# Patient Record
Sex: FEMALE | Race: WHITE | Employment: FULL TIME | URBAN - METROPOLITAN AREA
[De-identification: names, ages, dates, MRNs, and addresses within clinical notes are randomized per-mention and may not be internally consistent; named-entity substitution may affect disease eponyms.]

---

## 2017-12-08 PROBLEM — M25.361 RIGHT KNEE BUCKLING: Chronic | Status: ACTIVE | Noted: 2017-12-08

## 2017-12-12 PROBLEM — S83.511A RIGHT ACL TEAR: Status: ACTIVE | Noted: 2017-12-12

## 2018-03-13 ENCOUNTER — APPOINTMENT (OUTPATIENT)
Dept: PHYSICAL THERAPY | Age: 28
End: 2018-03-13
Payer: COMMERCIAL

## 2018-03-15 ENCOUNTER — HOSPITAL ENCOUNTER (OUTPATIENT)
Dept: PHYSICAL THERAPY | Age: 28
Setting detail: THERAPIES SERIES
Discharge: HOME | End: 2018-03-15
Payer: COMMERCIAL

## 2018-03-15 PROCEDURE — G8979 MOBILITY GOAL STATUS: HCPCS | Performed by: PHYSICAL THERAPIST

## 2018-03-15 PROCEDURE — G8978 MOBILITY CURRENT STATUS: HCPCS | Performed by: PHYSICAL THERAPIST

## 2018-03-15 PROCEDURE — 97530 THERAPEUTIC ACTIVITIES: CPT | Performed by: PHYSICAL THERAPIST

## 2018-03-15 PROCEDURE — 97110 THERAPEUTIC EXERCISES: CPT | Performed by: PHYSICAL THERAPIST

## 2018-03-15 NOTE — PROGRESS NOTES
[address]      Phone: [phone]  Fax: [phone]    Physical Therapy Daily Treatment Note  Date:  3/15/2018    Patient Name:  Naun Hall    :  1990  MRN: [id number]    Restrictions/Precautions:  Meniscus repair and ACL reconstruction protocols, 3/2/18 : s/p Doctor visit : cleared to discontinue brace and crutches , FWB R LE .   per meniscal repair protocol, . Diagnosis:  S/p ACL reconstruction, R meniscal injury  Treatment Diagnosis:    Insurance/Certification information:  [facility]  Referring Physician:  Garrett Choi MD  Plan of care signed (Y/N):    Visit# / total visits:  10/16-  Pain level: 0/10   Time In:  0804  Time Out:  9437    Subjective:.   Nothing to report     Exercises:  Exercise/Equipment Resistance/Repetitions Other comments     Recumbent bike 10 mins       Quad sets                         5 sec 3 x 10 reps      Hamstring sets 5 sec 3 x 10 reps      Side lying hip abd 1.5# 2 x 10 reps      Seated hip flex 1.5# 3 x 10 reps      Prone hamstring curls 1.5# 2 x 10 reps      SLR 1.5# x 10 reps      SAQ 1.5# 2 x 10        Standing hip 3 way B LE 2 x 10 reps each               II bars :    Mini squats < 60                        degrees 2x10              6 \" step ups  Fwd 2x10  Side 2x10             total gym 3 x 10 reps Level 7     Single leg ball toss 2 x 20 reps                           Comments:  N/A    Home Exercise Program:  3/1/18, quad sets, hamstring sets; 3/6/18 - SAQ     Manual Treatments:  N/A    Modalities:  N/A    Timed Code Treatment Minutes:  39    Total Treatment Minutes:  39    Treatment/Activity Tolerance:  [x] Patient tolerated treatment well [] Patient limited by fatigue  [] Patient limited by pain  [] Patient limited by other medical complications  [] Other:     Prognosis: [x] Good [] Fair  [] Poor    Patient Requires Follow-up: [x] Yes  [] No    Plan:   [x] Continue per plan of care [] Alter current plan (see comments)  [] Plan of care initiated [] Hold pending MD visit [] Discharge  Plan for Next Session:        Electronically signed by:  TONY Singh [address]

## 2018-03-20 ENCOUNTER — HOSPITAL ENCOUNTER (OUTPATIENT)
Dept: PHYSICAL THERAPY | Age: 28
Setting detail: THERAPIES SERIES
Discharge: HOME | End: 2018-03-20
Payer: COMMERCIAL

## 2018-03-20 PROCEDURE — 97110 THERAPEUTIC EXERCISES: CPT

## 2018-03-20 NOTE — PROGRESS NOTES
[address]      Phone: [phone]  Fax: [phone]    Physical Therapy Daily Treatment Note  Date:  3/20/2018    Patient Name:  Dana George    :  1990  MRN: [id number]    Restrictions/Precautions:  Meniscus repair and ACL reconstruction protocols, 3/2/18 : s/p Doctor visit : cleared to discontinue brace and crutches , FWB R LE .   per meniscal repair protocol, . Diagnosis:  S/p ACL reconstruction, R meniscal injury  Treatment Diagnosis:    Insurance/Certification information:  [facility]  Referring Physician:  Ramon Scruggs MD  Plan of care signed (Y/N):    Visit# / total visits:  -  Pain level: 0/10   Time In:  806  Time Out:  844    Subjective:.   Nothing to report     Exercises:  Exercise/Equipment Resistance/Repetitions Other comments     Recumbent bike 10 mins       Quad sets                         5 sec 3 x 10 reps      Hamstring sets 5 sec 3 x 10 reps      Side lying hip abd 1.5# 2 x 10 reps      Seated hip flex 1.5# 3 x 10 reps      Prone hamstring curls 1.5# 2 x 10 reps      SLR 1.5# x 10 reps      SAQ 1.5# 2 x 10        Standing hip 3 way B LE 2 x 10 reps each               II bars :    Mini squats < 60                        degrees 2x10              6 \" step ups  Fwd 2x10  Side 2x10             total gym 3 x 10 reps Level 7     Single leg ball toss 2 x 20 reps                           Comments:  N/A    Home Exercise Program:  3/1/18, quad sets, hamstring sets; 3/6/18 - SAQ     Manual Treatments:  N/A    Modalities:  N/A    Timed Code Treatment Minutes:  38    Total Treatment Minutes:  38    Treatment/Activity Tolerance:  [x] Patient tolerated treatment well [] Patient limited by fatigue  [] Patient limited by pain  [] Patient limited by other medical complications  [] Other:     Prognosis: [x] Good [] Fair  [] Poor    Patient Requires Follow-up: [x] Yes  [] No    Plan:   [x] Continue per plan of care [] Alter current

## 2018-03-22 ENCOUNTER — HOSPITAL ENCOUNTER (OUTPATIENT)
Dept: PHYSICAL THERAPY | Age: 28
Setting detail: THERAPIES SERIES
Discharge: HOME | End: 2018-03-22
Payer: COMMERCIAL

## 2018-03-22 PROCEDURE — 97110 THERAPEUTIC EXERCISES: CPT | Performed by: PHYSICAL THERAPIST

## 2018-04-03 ENCOUNTER — HOSPITAL ENCOUNTER (OUTPATIENT)
Dept: PHYSICAL THERAPY | Age: 28
Setting detail: THERAPIES SERIES
Discharge: HOME | End: 2018-04-03
Payer: COMMERCIAL

## 2018-04-03 PROCEDURE — 97110 THERAPEUTIC EXERCISES: CPT | Performed by: PHYSICAL THERAPIST

## 2018-04-03 PROCEDURE — 97530 THERAPEUTIC ACTIVITIES: CPT | Performed by: PHYSICAL THERAPIST

## 2018-04-05 ENCOUNTER — HOSPITAL ENCOUNTER (OUTPATIENT)
Dept: PHYSICAL THERAPY | Age: 28
Setting detail: THERAPIES SERIES
Discharge: HOME | End: 2018-04-05
Payer: COMMERCIAL

## 2018-04-05 PROCEDURE — 97110 THERAPEUTIC EXERCISES: CPT | Performed by: PHYSICAL THERAPIST

## 2018-04-06 ENCOUNTER — OFFICE VISIT (OUTPATIENT)
Dept: ORTHOPEDIC SURGERY | Age: 28
End: 2018-04-06

## 2018-04-06 ENCOUNTER — APPOINTMENT (OUTPATIENT)
Dept: PHYSICAL THERAPY | Age: 28
End: 2018-04-06
Payer: COMMERCIAL

## 2018-04-06 VITALS
WEIGHT: 155 LBS | DIASTOLIC BLOOD PRESSURE: 78 MMHG | BODY MASS INDEX: 29.27 KG/M2 | HEIGHT: 61 IN | SYSTOLIC BLOOD PRESSURE: 115 MMHG | HEART RATE: 67 BPM

## 2018-04-06 DIAGNOSIS — S83.511D RUPTURE OF ANTERIOR CRUCIATE LIGAMENT OF RIGHT KNEE, SUBSEQUENT ENCOUNTER: ICD-10-CM

## 2018-04-06 DIAGNOSIS — S83.8X1D MENISCAL INJURY, RIGHT, SUBSEQUENT ENCOUNTER: ICD-10-CM

## 2018-04-06 DIAGNOSIS — Z98.890 S/P ACL RECONSTRUCTION: Primary | ICD-10-CM

## 2018-04-06 DIAGNOSIS — M25.361 RIGHT KNEE BUCKLING: ICD-10-CM

## 2018-04-06 PROCEDURE — 99024 POSTOP FOLLOW-UP VISIT: CPT | Performed by: ORTHOPAEDIC SURGERY

## 2018-04-12 ENCOUNTER — HOSPITAL ENCOUNTER (OUTPATIENT)
Dept: PHYSICAL THERAPY | Age: 28
Setting detail: THERAPIES SERIES
Discharge: HOME | End: 2018-04-12
Payer: COMMERCIAL

## 2018-04-12 PROCEDURE — 97110 THERAPEUTIC EXERCISES: CPT | Performed by: PHYSICAL THERAPIST

## 2018-06-13 ENCOUNTER — OFFICE VISIT (OUTPATIENT)
Dept: ORTHOPEDIC SURGERY | Age: 28
End: 2018-06-13
Payer: COMMERCIAL

## 2018-06-13 VITALS
BODY MASS INDEX: 28.32 KG/M2 | SYSTOLIC BLOOD PRESSURE: 119 MMHG | HEIGHT: 61 IN | DIASTOLIC BLOOD PRESSURE: 76 MMHG | HEART RATE: 75 BPM | WEIGHT: 150 LBS

## 2018-06-13 DIAGNOSIS — M25.361 RIGHT KNEE BUCKLING: ICD-10-CM

## 2018-06-13 DIAGNOSIS — Z98.890 S/P ACL RECONSTRUCTION: Primary | ICD-10-CM

## 2018-06-13 DIAGNOSIS — S83.511D RUPTURE OF ANTERIOR CRUCIATE LIGAMENT OF RIGHT KNEE, SUBSEQUENT ENCOUNTER: ICD-10-CM

## 2018-06-13 DIAGNOSIS — S83.8X1D MENISCAL INJURY, RIGHT, SUBSEQUENT ENCOUNTER: ICD-10-CM

## 2018-06-13 PROCEDURE — 99213 OFFICE O/P EST LOW 20 MIN: CPT | Performed by: ORTHOPAEDIC SURGERY

## 2019-04-08 ENCOUNTER — APPOINTMENT (OUTPATIENT)
Dept: GENERAL RADIOLOGY | Age: 29
End: 2019-04-08
Payer: COMMERCIAL

## 2019-04-08 ENCOUNTER — HOSPITAL ENCOUNTER (EMERGENCY)
Age: 29
Discharge: HOME | End: 2019-04-08
Attending: EMERGENCY MEDICINE
Payer: COMMERCIAL

## 2019-04-08 VITALS
SYSTOLIC BLOOD PRESSURE: 116 MMHG | HEIGHT: 62 IN | RESPIRATION RATE: 14 BRPM | WEIGHT: 145 LBS | DIASTOLIC BLOOD PRESSURE: 70 MMHG | BODY MASS INDEX: 26.68 KG/M2 | TEMPERATURE: 98.2 F | HEART RATE: 80 BPM | OXYGEN SATURATION: 100 %

## 2019-04-08 DIAGNOSIS — F41.1 ANXIETY STATE: Primary | ICD-10-CM

## 2019-04-08 PROCEDURE — 99284 EMERGENCY DEPT VISIT MOD MDM: CPT

## 2019-04-08 PROCEDURE — 71046 X-RAY EXAM CHEST 2 VIEWS: CPT

## 2019-04-08 PROCEDURE — 6370000000 HC RX 637 (ALT 250 FOR IP): Performed by: EMERGENCY MEDICINE

## 2019-04-08 RX ORDER — LORAZEPAM 1 MG/1
1 TABLET ORAL ONCE
Status: COMPLETED | OUTPATIENT
Start: 2019-04-08 | End: 2019-04-08

## 2019-04-08 RX ADMIN — LORAZEPAM 1 MG: 1 TABLET ORAL at 21:31

## 2019-04-08 ASSESSMENT — PAIN - FUNCTIONAL ASSESSMENT: PAIN_FUNCTIONAL_ASSESSMENT: PREVENTS OR INTERFERES SOME ACTIVE ACTIVITIES AND ADLS

## 2019-04-08 ASSESSMENT — PAIN SCALES - GENERAL: PAINLEVEL_OUTOF10: 4

## 2019-04-08 ASSESSMENT — PAIN DESCRIPTION - FREQUENCY: FREQUENCY: INTERMITTENT

## 2019-04-08 ASSESSMENT — PAIN DESCRIPTION - ONSET: ONSET: ON-GOING

## 2019-04-08 ASSESSMENT — PAIN DESCRIPTION - PAIN TYPE: TYPE: ACUTE PAIN

## 2019-04-08 ASSESSMENT — PAIN DESCRIPTION - DESCRIPTORS: DESCRIPTORS: TIGHTNESS

## 2019-04-08 NOTE — ED NOTES
Awake and alert-  C/o unable to catch breath-  With intermittent chest tightness     Yanelis Iglesias RN  04/08/19 [id number]

## 2019-04-09 NOTE — ED NOTES
Discharged-  To follow with pmd  rx x 1 given.   Outpatient counseling numbers given per Dr Sorin Alexander request     Margy Arreola, GIL  04/08/19 2535

## 2019-04-09 NOTE — ED PROVIDER NOTES
HPI:   Trudy Elizabeth is a 34 y.o. female presenting to the ED for chest pain, beginning three days ago. The complaint has been persistent, moderate in severity, and worsened by nothing. Pt reports that she is having a difficult time catching her breath and hs recently begun to experience chest tightness when taking a deep breath. Pt notes that she is under a lot of stress. Pt denies LOC, HA, back pain, neck pain, n/v/d, fever,chills, dizziness, coughing, abdominal pain or any other general complaints. ROS:   Pertinent positives and negatives are stated within HPI, all other systems reviewed and are negative.    --------------------------------------------- PAST HISTORY ---------------------------------------------  Past Medical History:  has a past medical history of ADD (attention deficit disorder with hyperactivity) and Right ACL tear. Past Surgical History:  has a past surgical history that includes  section (2013) and Knee arthroscopy (Right, 2018). Social History:  reports that she has quit smoking. Her smoking use included cigarettes. She started smoking about 16 months ago. She smoked 0.50 packs per day. She has never used smokeless tobacco. She reports that she drinks alcohol. She reports that she has current or past drug history. Drug: Marijuana. Family History: family history is not on file. The patients home medications have been reviewed. Allergies: Patient has no known allergies. -------------------------------------------------- RESULTS -------------------------------------------------  All laboratory and radiology results have been personally reviewed by myself   LABS:  No results found for this visit on 19.     RADIOLOGY:  Interpreted by Radiologist.  XR CHEST STANDARD (2 VW)    (Results Pending)       ------------------------- NURSING NOTES AND VITALS REVIEWED ---------------------------   The nursing notes within the ED encounter and vital signs as below have been reviewed. /70   Pulse 80   Temp 98.2 °F (36.8 °C) (Oral)   Resp 14   Ht 5' 2\" (1.575 m)   Wt 145 lb (65.8 kg)   LMP 04/03/2019   SpO2 100%   BMI 26.52 kg/m²   Oxygen Saturation Interpretation: Normal    ---------------------------------------------------PHYSICAL EXAM--------------------------------------    Constitutional/General: Alert and oriented x3, well appearing, non toxic in NAD  Head: NC/AT  Eyes: PERRL, EOMI  Mouth: Oropharynx clear, handling secretions, no trismus  Neck: Supple, full ROM,   Pulmonary: Lungs clear to auscultation bilaterally, no wheezes, rales, or rhonchi. Not in respiratory distress  Cardiovascular:  Regular rate and rhythm, no murmurs, gallops, or rubs. 2+ distal pulses  Abdomen: Soft, non tender, non distended,   Extremities: Moves all extremities x 4. Warm and well perfused  Skin: warm and dry   Neurologic: GCS 15,  Psych: Normal Affect      ------------------------------ ED COURSE/MEDICAL DECISION MAKING----------------------  Medications - No data to display    Medical Decision Making:    Pt is a 34year old female presenting for chest pain that began three days ago. Pt states that she is unable to catch her breath and has recently begun to experience chest tightness with deep breaths. Pt notes that she is under a lot of stress. Chest XR was obtained and reviewed. Pt can be discharged home. Pt is agreeable and will be discharged home. She should follow up with her PCP in a couple of days and return if her symptoms worsen. Counseling: The emergency provider has spoken with the patient and spouse/SO and discussed todays results, in addition to providing specific details for the plan of care and counseling regarding the diagnosis and prognosis.   Questions are answered at this time and they are agreeable with the plan.      --------------------------------- IMPRESSION AND DISPOSITION ---------------------------------    IMPRESSION  No diagnosis found.    DISPOSITION  Disposition: Discharge to home  Patient condition is stable    4/8/19, 8:22 PM.    This note is prepared by Sudhir Mora, acting as Scribe for Elton Pradhan DO. Elton Pradhan DO:  The scribe's documentation has been prepared under my direction and personally reviewed by me in its entirety. I confirm that the note above accurately reflects all work, treatment, procedures, and medical decision making performed by me.          Elton Pradhan DO  04/09/19 [id number] Zheng rCoft DO  04/09/19 7044

## 2021-06-09 ENCOUNTER — HOSPITAL ENCOUNTER (EMERGENCY)
Age: 31
Discharge: HOME | End: 2021-06-09
Payer: COMMERCIAL

## 2021-06-09 VITALS
HEIGHT: 62 IN | BODY MASS INDEX: 23 KG/M2 | RESPIRATION RATE: 16 BRPM | DIASTOLIC BLOOD PRESSURE: 58 MMHG | WEIGHT: 125 LBS | SYSTOLIC BLOOD PRESSURE: 100 MMHG | HEART RATE: 78 BPM | OXYGEN SATURATION: 98 %

## 2021-06-09 DIAGNOSIS — N39.0 URINARY TRACT INFECTION WITHOUT HEMATURIA, SITE UNSPECIFIED: Primary | ICD-10-CM

## 2021-06-09 LAB
BACTERIA: ABNORMAL /HPF
BILIRUBIN URINE: ABNORMAL
BLOOD, URINE: ABNORMAL
CLARITY: CLEAR
COLOR: YELLOW
GLUCOSE URINE: 250 MG/DL
KETONES, URINE: ABNORMAL MG/DL
LEUKOCYTE ESTERASE, URINE: ABNORMAL
NITRITE, URINE: POSITIVE
PH UA: 6.5 (ref 5–9)
PROTEIN UA: >=300 MG/DL
RBC UA: ABNORMAL /HPF (ref 0–2)
SPECIFIC GRAVITY UA: 1.02 (ref 1–1.03)
UROBILINOGEN, URINE: >=8 E.U./DL
WBC UA: ABNORMAL /HPF (ref 0–5)

## 2021-06-09 PROCEDURE — 99282 EMERGENCY DEPT VISIT SF MDM: CPT

## 2021-06-09 PROCEDURE — 81001 URINALYSIS AUTO W/SCOPE: CPT

## 2021-06-09 RX ORDER — CEFDINIR 300 MG/1
300 CAPSULE ORAL 2 TIMES DAILY
Qty: 20 CAPSULE | Refills: 0 | Status: SHIPPED | OUTPATIENT
Start: 2021-06-09 | End: 2021-06-19

## 2021-06-09 NOTE — ED PROVIDER NOTES
to auscultation and breath sounds equal.  CV:  Regular rate and rhythm, normal heart sounds, without pathological murmurs, ectopy, gallops, or rubs. GI:  normal appearing, non-distended with no visible hernias. Bowel sounds: normal bowel sounds. Tenderness: No abdominal tenderness, guarding, rebound, rigidity or pulsatile mass. .        Liver: non-tender. Spleen:  non-tender. : (chaperone present during examination). not indicated / deferred. Back: CVA Tenderness: No.  Integument:  Normal turgor. Warm, dry, without visible rash, unless noted elsewhere. Lymphatics: No lymphangitis or adenopathy noted. Neurological:  Oriented. Motor functions intact. Lab / Imaging Results   (All laboratory and radiology results have been personally reviewed by myself)  Labs:  Results for orders placed or performed during the hospital encounter of 06/09/21   Urinalysis   Result Value Ref Range    Color, UA Yellow Straw/Yellow    Clarity, UA Clear Clear    Glucose, Ur 250 (A) Negative mg/dL    Bilirubin Urine MODERATE (A) Negative    Ketones, Urine TRACE (A) Negative mg/dL    Specific Gravity, UA 1.020 1.005 - 1.030    Blood, Urine MODERATE (A) Negative    pH, UA 6.5 5.0 - 9.0    Protein, UA >=300 (A) Negative mg/dL    Urobilinogen, Urine >=8.0 (A) <2.0 E.U./dL    Nitrite, Urine POSITIVE (A) Negative    Leukocyte Esterase, Urine TRACE (A) Negative   Microscopic Urinalysis   Result Value Ref Range    WBC, UA 0-1 0 - 5 /HPF    RBC, UA 2-5 0 - 2 /HPF    Bacteria, UA RARE (A) None Seen /HPF       Imaging: All Radiology results interpreted by Radiologist unless otherwise noted. No orders to display     ED Course / Medical Decision Making   Medications - No data to display      Medical Decision Making:    Patient is well appearing, non toxic and appropriate for outpatient management. Plan is for symptom management and PCP follow up.      Plan of Care/Counseling:  Myself: DEDRICK Cardenas reviewed

## 2021-12-07 ENCOUNTER — APPOINTMENT (OUTPATIENT)
Dept: CT IMAGING | Age: 31
End: 2021-12-07
Payer: COMMERCIAL

## 2021-12-07 ENCOUNTER — HOSPITAL ENCOUNTER (EMERGENCY)
Age: 31
Discharge: ANOTHER HOSPITAL | End: 2021-12-08
Attending: STUDENT IN AN ORGANIZED HEALTH CARE EDUCATION/TRAINING PROGRAM
Payer: COMMERCIAL

## 2021-12-07 DIAGNOSIS — R10.84 GENERALIZED ABDOMINAL PAIN: ICD-10-CM

## 2021-12-07 DIAGNOSIS — K56.600 PARTIAL SMALL BOWEL OBSTRUCTION (HCC): Primary | ICD-10-CM

## 2021-12-07 DIAGNOSIS — R11.2 NON-INTRACTABLE VOMITING WITH NAUSEA, UNSPECIFIED VOMITING TYPE: ICD-10-CM

## 2021-12-07 PROBLEM — K56.609 SMALL BOWEL OBSTRUCTION (HCC): Status: ACTIVE | Noted: 2021-12-07

## 2021-12-07 LAB
ALBUMIN SERPL-MCNC: 4.3 G/DL (ref 3.5–5.2)
ALP BLD-CCNC: 70 U/L (ref 35–104)
ALT SERPL-CCNC: 13 U/L (ref 0–32)
ANION GAP SERPL CALCULATED.3IONS-SCNC: 9 MMOL/L (ref 7–16)
AST SERPL-CCNC: 21 U/L (ref 0–31)
BACTERIA: ABNORMAL /HPF
BASOPHILS ABSOLUTE: 0.03 E9/L (ref 0–0.2)
BASOPHILS RELATIVE PERCENT: 0.3 % (ref 0–2)
BILIRUB SERPL-MCNC: 0.7 MG/DL (ref 0–1.2)
BILIRUBIN URINE: NEGATIVE
BLOOD, URINE: ABNORMAL
BUN BLDV-MCNC: 6 MG/DL (ref 6–20)
CALCIUM SERPL-MCNC: 9.6 MG/DL (ref 8.6–10.2)
CHLORIDE BLD-SCNC: 105 MMOL/L (ref 98–107)
CLARITY: CLEAR
CO2: 23 MMOL/L (ref 22–29)
COLOR: YELLOW
CREAT SERPL-MCNC: 0.8 MG/DL (ref 0.5–1)
EOSINOPHILS ABSOLUTE: 0.01 E9/L (ref 0.05–0.5)
EOSINOPHILS RELATIVE PERCENT: 0.1 % (ref 0–6)
EPITHELIAL CELLS, UA: ABNORMAL /HPF
GFR AFRICAN AMERICAN: >60
GFR NON-AFRICAN AMERICAN: >60 ML/MIN/1.73
GLUCOSE BLD-MCNC: 113 MG/DL (ref 74–99)
GLUCOSE URINE: NEGATIVE MG/DL
HCG(URINE) PREGNANCY TEST: NEGATIVE
HCT VFR BLD CALC: 42 % (ref 34–48)
HEMOGLOBIN: 14.1 G/DL (ref 11.5–15.5)
IMMATURE GRANULOCYTES #: 0.07 E9/L
IMMATURE GRANULOCYTES %: 0.7 % (ref 0–5)
KETONES, URINE: >=80 MG/DL
LACTIC ACID: 0.8 MMOL/L (ref 0.5–2.2)
LEUKOCYTE ESTERASE, URINE: NEGATIVE
LIPASE: 27 U/L (ref 13–60)
LYMPHOCYTES ABSOLUTE: 0.59 E9/L (ref 1.5–4)
LYMPHOCYTES RELATIVE PERCENT: 5.6 % (ref 20–42)
MCH RBC QN AUTO: 29.8 PG (ref 26–35)
MCHC RBC AUTO-ENTMCNC: 33.6 % (ref 32–34.5)
MCV RBC AUTO: 88.8 FL (ref 80–99.9)
MONOCYTES ABSOLUTE: 0.21 E9/L (ref 0.1–0.95)
MONOCYTES RELATIVE PERCENT: 2 % (ref 2–12)
NEUTROPHILS ABSOLUTE: 9.57 E9/L (ref 1.8–7.3)
NEUTROPHILS RELATIVE PERCENT: 91.3 % (ref 43–80)
NITRITE, URINE: NEGATIVE
PDW BLD-RTO: 12.6 FL (ref 11.5–15)
PH UA: 6 (ref 5–9)
PLATELET # BLD: 322 E9/L (ref 130–450)
PMV BLD AUTO: 9.5 FL (ref 7–12)
POTASSIUM SERPL-SCNC: 4.2 MMOL/L (ref 3.5–5)
PROTEIN UA: NEGATIVE MG/DL
RBC # BLD: 4.73 E12/L (ref 3.5–5.5)
RBC UA: ABNORMAL /HPF (ref 0–2)
SODIUM BLD-SCNC: 137 MMOL/L (ref 132–146)
SPECIFIC GRAVITY UA: 1.02 (ref 1–1.03)
TOTAL PROTEIN: 7.5 G/DL (ref 6.4–8.3)
UROBILINOGEN, URINE: 0.2 E.U./DL
WBC # BLD: 10.5 E9/L (ref 4.5–11.5)
WBC UA: ABNORMAL /HPF (ref 0–5)

## 2021-12-07 PROCEDURE — 36415 COLL VENOUS BLD VENIPUNCTURE: CPT

## 2021-12-07 PROCEDURE — 83605 ASSAY OF LACTIC ACID: CPT

## 2021-12-07 PROCEDURE — 81001 URINALYSIS AUTO W/SCOPE: CPT

## 2021-12-07 PROCEDURE — 80053 COMPREHEN METABOLIC PANEL: CPT

## 2021-12-07 PROCEDURE — 96374 THER/PROPH/DIAG INJ IV PUSH: CPT

## 2021-12-07 PROCEDURE — 6360000004 HC RX CONTRAST MEDICATION: Performed by: RADIOLOGY

## 2021-12-07 PROCEDURE — 85025 COMPLETE CBC W/AUTO DIFF WBC: CPT

## 2021-12-07 PROCEDURE — 99284 EMERGENCY DEPT VISIT MOD MDM: CPT

## 2021-12-07 PROCEDURE — 74177 CT ABD & PELVIS W/CONTRAST: CPT

## 2021-12-07 PROCEDURE — 96372 THER/PROPH/DIAG INJ SC/IM: CPT

## 2021-12-07 PROCEDURE — 2580000003 HC RX 258: Performed by: PHYSICIAN ASSISTANT

## 2021-12-07 PROCEDURE — 81025 URINE PREGNANCY TEST: CPT

## 2021-12-07 PROCEDURE — 83690 ASSAY OF LIPASE: CPT

## 2021-12-07 PROCEDURE — 6360000002 HC RX W HCPCS: Performed by: PHYSICIAN ASSISTANT

## 2021-12-07 PROCEDURE — 96375 TX/PRO/DX INJ NEW DRUG ADDON: CPT

## 2021-12-07 RX ORDER — METOCLOPRAMIDE HYDROCHLORIDE 5 MG/ML
10 INJECTION INTRAMUSCULAR; INTRAVENOUS ONCE
Status: COMPLETED | OUTPATIENT
Start: 2021-12-07 | End: 2021-12-07

## 2021-12-07 RX ORDER — 0.9 % SODIUM CHLORIDE 0.9 %
1000 INTRAVENOUS SOLUTION INTRAVENOUS ONCE
Status: COMPLETED | OUTPATIENT
Start: 2021-12-07 | End: 2021-12-07

## 2021-12-07 RX ORDER — DICYCLOMINE HYDROCHLORIDE 10 MG/ML
20 INJECTION INTRAMUSCULAR ONCE
Status: COMPLETED | OUTPATIENT
Start: 2021-12-07 | End: 2021-12-07

## 2021-12-07 RX ORDER — MORPHINE SULFATE 4 MG/ML
4 INJECTION, SOLUTION INTRAMUSCULAR; INTRAVENOUS ONCE
Status: COMPLETED | OUTPATIENT
Start: 2021-12-07 | End: 2021-12-07

## 2021-12-07 RX ADMIN — DICYCLOMINE HYDROCHLORIDE 20 MG: 10 INJECTION INTRAMUSCULAR at 14:04

## 2021-12-07 RX ADMIN — IOPAMIDOL 75 ML: 755 INJECTION, SOLUTION INTRAVENOUS at 15:18

## 2021-12-07 RX ADMIN — SODIUM CHLORIDE 1000 ML: 9 INJECTION, SOLUTION INTRAVENOUS at 14:04

## 2021-12-07 RX ADMIN — MORPHINE SULFATE 4 MG: 4 INJECTION INTRAVENOUS at 14:04

## 2021-12-07 RX ADMIN — SODIUM CHLORIDE 1000 ML: 9 INJECTION, SOLUTION INTRAVENOUS at 16:28

## 2021-12-07 RX ADMIN — METOCLOPRAMIDE HYDROCHLORIDE 10 MG: 5 INJECTION INTRAMUSCULAR; INTRAVENOUS at 14:04

## 2021-12-07 ASSESSMENT — PAIN DESCRIPTION - DESCRIPTORS: DESCRIPTORS: DISCOMFORT

## 2021-12-07 ASSESSMENT — PAIN DESCRIPTION - PAIN TYPE: TYPE: ACUTE PAIN

## 2021-12-07 ASSESSMENT — PAIN DESCRIPTION - FREQUENCY: FREQUENCY: INTERMITTENT

## 2021-12-07 ASSESSMENT — PAIN DESCRIPTION - PROGRESSION: CLINICAL_PROGRESSION: GRADUALLY WORSENING

## 2021-12-07 ASSESSMENT — PAIN DESCRIPTION - ONSET: ONSET: ON-GOING

## 2021-12-07 ASSESSMENT — PAIN SCALES - GENERAL
PAINLEVEL_OUTOF10: 3
PAINLEVEL_OUTOF10: 0

## 2021-12-07 ASSESSMENT — PAIN DESCRIPTION - ORIENTATION: ORIENTATION: UPPER

## 2021-12-07 ASSESSMENT — PAIN - FUNCTIONAL ASSESSMENT: PAIN_FUNCTIONAL_ASSESSMENT: PREVENTS OR INTERFERES SOME ACTIVE ACTIVITIES AND ADLS

## 2021-12-07 ASSESSMENT — PAIN DESCRIPTION - LOCATION: LOCATION: ABDOMEN

## 2021-12-07 NOTE — ED NOTES
Called access line and [facility]  12/07/21 2376

## 2021-12-07 NOTE — ED PROVIDER NOTES
per day. She has never used smokeless tobacco. She reports current alcohol use. She reports current drug use. Drug: Marijuana Moon Diop). Family History: family history is not on file. The patients home medications have been reviewed. Allergies: Patient has no known allergies.     -------------------------------------------------- RESULTS -------------------------------------------------  All laboratory and radiology results have been personally reviewed by myself   LABS:  Results for orders placed or performed during the hospital encounter of 12/07/21   CBC auto differential   Result Value Ref Range    WBC 10.5 4.5 - 11.5 E9/L    RBC 4.73 3.50 - 5.50 E12/L    Hemoglobin 14.1 11.5 - 15.5 g/dL    Hematocrit 42.0 34.0 - 48.0 %    MCV 88.8 80.0 - 99.9 fL    MCH 29.8 26.0 - 35.0 pg    MCHC 33.6 32.0 - 34.5 %    RDW 12.6 11.5 - 15.0 fL    Platelets 217 365 - 541 E9/L    MPV 9.5 7.0 - 12.0 fL    Neutrophils % 91.3 (H) 43.0 - 80.0 %    Immature Granulocytes % 0.7 0.0 - 5.0 %    Lymphocytes % 5.6 (L) 20.0 - 42.0 %    Monocytes % 2.0 2.0 - 12.0 %    Eosinophils % 0.1 0.0 - 6.0 %    Basophils % 0.3 0.0 - 2.0 %    Neutrophils Absolute 9.57 (H) 1.80 - 7.30 E9/L    Immature Granulocytes # 0.07 E9/L    Lymphocytes Absolute 0.59 (L) 1.50 - 4.00 E9/L    Monocytes Absolute 0.21 0.10 - 0.95 E9/L    Eosinophils Absolute 0.01 (L) 0.05 - 0.50 E9/L    Basophils Absolute 0.03 0.00 - 0.20 E9/L   Comprehensive Metabolic Panel   Result Value Ref Range    Sodium 137 132 - 146 mmol/L    Potassium 4.2 3.5 - 5.0 mmol/L    Chloride 105 98 - 107 mmol/L    CO2 23 22 - 29 mmol/L    Anion Gap 9 7 - 16 mmol/L    Glucose 113 (H) 74 - 99 mg/dL    BUN 6 6 - 20 mg/dL    CREATININE 0.8 0.5 - 1.0 mg/dL    GFR Non-African American >60 >=60 mL/min/1.73    GFR African American >60     Calcium 9.6 8.6 - 10.2 mg/dL    Total Protein 7.5 6.4 - 8.3 g/dL    Albumin 4.3 3.5 - 5.2 g/dL    Total Bilirubin 0.7 0.0 - 1.2 mg/dL    Alkaline Phosphatase 70 35 - 104 U/L    ALT 13 0 - 32 U/L    AST 21 0 - 31 U/L   Lipase   Result Value Ref Range    Lipase 27 13 - 60 U/L   Lactic Acid, Plasma   Result Value Ref Range    Lactic Acid 0.8 0.5 - 2.2 mmol/L   Urinalysis with Microscopic   Result Value Ref Range    Color, UA Yellow Straw/Yellow    Clarity, UA Clear Clear    Glucose, Ur Negative Negative mg/dL    Bilirubin Urine Negative Negative    Ketones, Urine >=80 (A) Negative mg/dL    Specific Gravity, UA 1.020 1.005 - 1.030    Blood, Urine SMALL (A) Negative    pH, UA 6.0 5.0 - 9.0    Protein, UA Negative Negative mg/dL    Urobilinogen, Urine 0.2 <2.0 E.U./dL    Nitrite, Urine Negative Negative    Leukocyte Esterase, Urine Negative Negative    WBC, UA 0-1 0 - 5 /HPF    RBC, UA 2-5 0 - 2 /HPF    Epithelial Cells, UA FEW /HPF    Bacteria, UA NONE SEEN None Seen /HPF   Pregnancy, Urine   Result Value Ref Range    HCG(Urine) Pregnancy Test NEGATIVE NEGATIVE       RADIOLOGY:  Interpreted by Radiologist.  CT ABDOMEN PELVIS W IV CONTRAST Additional Contrast? None   Final Result   1. There is wall thickening within the ileum with findings suggestive of   partial small bowel obstruction. This is likely secondary to Crohn's   disease. The differential diagnosis includes infection such as Yersinia and   Salmonella as well as vasculitis. 2. Small amount ascites. ------------------------- NURSING NOTES AND VITALS REVIEWED ---------------------------   The nursing notes within the ED encounter and vital signs as below have been reviewed.    BP (!) 101/56   Pulse 74   Temp 98.1 °F (36.7 °C) (Oral)   Resp 16   Wt 127 lb (57.6 kg)   LMP 11/23/2021   SpO2 99%   BMI 23.23 kg/m²   Oxygen Saturation Interpretation: Normal      ---------------------------------------------------PHYSICAL EXAM--------------------------------------      Constitutional/General: Alert and oriented x3, very anxious appearing, tearful, patient holding central abdomen on initial exam, non toxic in NAD  Head: Normocephalic and atraumatic  Eyes: PERRL, EOMI  Mouth: Oropharynx clear, mucosa moist.  Neck: Supple, full ROM  Pulmonary: Lungs clear to auscultation bilaterally. Not in respiratory distress  Cardiovascular:  Regular rate and rhythm, no ectopy. 2+ distal pulses  Abdomen:  Diffuse tenderness to mid epigastric extending to mid abdomen on initial exam, marked tenderness and guarding to local palpation, no palpable mass. Bowel sounds somewhat hyperactive initially, no suprapubic tenderness. No significant abdominal distension. No CVA tenderness posteriorly bilaterally. Extremities: Moves all extremities x 4. Warm and well perfused  Skin: warm and dry without rash  Neurologic: GCS 15  Psych: Tearful on exam, somewhat anxious.      ------------------------------ ED COURSE/MEDICAL DECISION MAKING----------------------  Medications   0.9 % sodium chloride bolus (0 mLs IntraVENous Stopped 12/7/21 1535)   metoclopramide (REGLAN) injection 10 mg (10 mg IntraVENous Given 12/7/21 1404)   dicyclomine (BENTYL) injection 20 mg (20 mg IntraMUSCular Given 12/7/21 1404)   morphine sulfate (PF) injection 4 mg (4 mg IntraVENous Given 12/7/21 1404)   iopamidol (ISOVUE-370) 76 % injection 75 mL (75 mLs IntraVENous Given 12/7/21 1518)   0.9 % sodium chloride bolus (0 mLs IntraVENous Stopped 12/7/21 1842)       ED COURSE:       Medical Decision Making:    Patient to ER, complaints of severe abdominal pain, awoke patient at 4am today, patient having severe pain- has never had pain such as this in the past.  Patient reports no known fever or chills. Patient with persistent episodes of vomiting. Patient advised of need to check labs. IVF as well as Reglan, Bentyl and Morphine ordered for pain. CT imaging of abdomen and pelvis with IV contrast ordered. Patient mother with history of Crohns. 3PM Patient rechecked and feeling much improved after medications given. Patient awaiting CT imaging.   4:00PM CT results reviewed with Dr. Lanny Blizzard, evidence of partial small bowel obstruction with evidence of suspected Crohns noted. Patient with FH of Crohns in her mother. Discussed CT results with patient at length. Patient is feeling improved after medications given, but still having some pain. Patient has had a few ice chips, but otherwise resting and still with some pain. Patient advised of need for admission for bowel rest and further evaluation. Patient requests admission to [facility]. Patient initially with no surgical preference and then changed her mind and wanted Dr. Frances Alcaraz. Consult:  Spoke with Dr. Frances Alcaraz, he agrees to be consultant, but feels patient needs admission to medicine for Crohns. Consult placed to medicine and spoke with Dr. Qing Retana and she accepted admission to Med Surg and transfer to [facility] for admission/discussed case. She was made aware that I did discuss case with Dr. Frances Alcaraz who would agree to be consultant on case if needed. Patient aware that she will remain in ER until bed is available to be transferred. Patient will remain NPO per Dr. Qing Retana orders. Counseling: The emergency provider has spoken with the patient and discussed todays results, in addition to providing specific details for the plan of care and counseling regarding the diagnosis and prognosis. Questions are answered at this time and they are agreeable with the plan.      --------------------------------- IMPRESSION AND DISPOSITION ---------------------------------    IMPRESSION  1. Partial small bowel obstruction ([address]    2. Non-intractable vomiting with nausea, unspecified vomiting type    3. Generalized abdominal pain        DISPOSITION  Disposition: Transfer to [address] [place] [address] for admission  Patient condition is stable      NOTE: This report was transcribed using voice recognition software.  Every effort was made to ensure accuracy; however, inadvertent computerized transcription errors may be present       Yomi All Ina Holstein, PA-C  12/08/21 0007

## 2021-12-08 ENCOUNTER — HOSPITAL ENCOUNTER (INPATIENT)
Age: 31
LOS: 1 days | Discharge: HOME | DRG: 389 | End: 2021-12-09
Attending: FAMILY MEDICINE | Admitting: FAMILY MEDICINE
Payer: COMMERCIAL

## 2021-12-08 VITALS
DIASTOLIC BLOOD PRESSURE: 62 MMHG | WEIGHT: 127 LBS | OXYGEN SATURATION: 100 % | HEART RATE: 84 BPM | SYSTOLIC BLOOD PRESSURE: 106 MMHG | TEMPERATURE: 97.8 F | BODY MASS INDEX: 23.23 KG/M2 | RESPIRATION RATE: 16 BRPM

## 2021-12-08 LAB
C-REACTIVE PROTEIN: 0.4 MG/DL (ref 0–0.4)
SEDIMENTATION RATE, ERYTHROCYTE: 7 MM/HR (ref 0–20)

## 2021-12-08 PROCEDURE — 1200000000 HC SEMI PRIVATE

## 2021-12-08 PROCEDURE — 2580000003 HC RX 258: Performed by: INTERNAL MEDICINE

## 2021-12-08 PROCEDURE — 99223 1ST HOSP IP/OBS HIGH 75: CPT | Performed by: STUDENT IN AN ORGANIZED HEALTH CARE EDUCATION/TRAINING PROGRAM

## 2021-12-08 PROCEDURE — 83993 ASSAY FOR CALPROTECTIN FECAL: CPT

## 2021-12-08 PROCEDURE — 87329 GIARDIA AG IA: CPT

## 2021-12-08 PROCEDURE — 36415 COLL VENOUS BLD VENIPUNCTURE: CPT

## 2021-12-08 PROCEDURE — 89055 LEUKOCYTE ASSESSMENT FECAL: CPT

## 2021-12-08 PROCEDURE — 87045 FECES CULTURE AEROBIC BACT: CPT

## 2021-12-08 PROCEDURE — 85651 RBC SED RATE NONAUTOMATED: CPT

## 2021-12-08 PROCEDURE — 86140 C-REACTIVE PROTEIN: CPT

## 2021-12-08 PROCEDURE — APPSS45 APP SPLIT SHARED TIME 31-45 MINUTES: Performed by: NURSE PRACTITIONER

## 2021-12-08 RX ORDER — MORPHINE SULFATE 2 MG/ML
1 INJECTION, SOLUTION INTRAMUSCULAR; INTRAVENOUS EVERY 6 HOURS PRN
Status: DISCONTINUED | OUTPATIENT
Start: 2021-12-08 | End: 2021-12-09 | Stop reason: HOSPADM

## 2021-12-08 RX ORDER — SODIUM CHLORIDE, SODIUM LACTATE, POTASSIUM CHLORIDE, CALCIUM CHLORIDE 600; 310; 30; 20 MG/100ML; MG/100ML; MG/100ML; MG/100ML
INJECTION, SOLUTION INTRAVENOUS CONTINUOUS
Status: DISCONTINUED | OUTPATIENT
Start: 2021-12-08 | End: 2021-12-09 | Stop reason: HOSPADM

## 2021-12-08 RX ORDER — ONDANSETRON 2 MG/ML
4 INJECTION INTRAMUSCULAR; INTRAVENOUS EVERY 6 HOURS PRN
Status: DISCONTINUED | OUTPATIENT
Start: 2021-12-08 | End: 2021-12-09 | Stop reason: HOSPADM

## 2021-12-08 RX ADMIN — SODIUM CHLORIDE, POTASSIUM CHLORIDE, SODIUM LACTATE AND CALCIUM CHLORIDE: 600; 310; 30; 20 INJECTION, SOLUTION INTRAVENOUS at 06:55

## 2021-12-08 RX ADMIN — SODIUM CHLORIDE, POTASSIUM CHLORIDE, SODIUM LACTATE AND CALCIUM CHLORIDE: 600; 310; 30; 20 INJECTION, SOLUTION INTRAVENOUS at 19:58

## 2021-12-08 ASSESSMENT — PAIN SCALES - GENERAL
PAINLEVEL_OUTOF10: 0

## 2021-12-08 NOTE — PROGRESS NOTES
P Quality Flow/Interdisciplinary Rounds Progress Note        Quality Flow Rounds held on December 8, 2021    Disciplines Attending:  Bedside Nurse,  and     Zayda Young was admitted on 12/8/2021  5:07 AM    Anticipated Discharge Date:       Disposition:    Aubrey Score:  Aubrey Scale Score: 22    Readmission Risk              Risk of Unplanned Readmission:  6           Discussed patient goal for the day, patient clinical progression, and barriers to discharge. The following Goal(s) of the Day/Commitment(s) have been identified:  check attending's plan, pain control.       Oneyda Fletcher RN  December 8, 2021

## 2021-12-08 NOTE — ED NOTES
Access line called at this time to tell us bed is ready.  Pt is going to [facility]  12/08/21 0101       [employer]  12/08/21 0101

## 2021-12-08 NOTE — PLAN OF CARE
Problem: DISCHARGE BARRIERS  Goal: Patient's continuum of care needs are met  Outcome: Met This Shift     Problem: Bowel/Gastric:  Goal: Ability to achieve a regular elimination pattern will improve  Description: Ability to achieve a regular elimination pattern will improve  Outcome: Met This Shift     Problem: Bowel/Gastric:  Goal: Control of bowel function will improve  Description: Control of bowel function will improve  Outcome: Met This Shift     Problem: Bowel/Gastric:  Goal: Control of bowel function will improve  Description: Control of bowel function will improve  Outcome: Met This Shift     Problem:  Bowel/Gastric:  Goal: Will not experience complications related to bowel motility  Description: Will not experience complications related to bowel motility  Outcome: Met This Shift     Problem: Nutritional:  Goal: Maintenance of adequate nutrition will improve  Description: Maintenance of adequate nutrition will improve  Outcome: Met This Shift

## 2021-12-08 NOTE — H&P
has no known allergies. Social History:    reports that she has quit smoking. Her smoking use included cigarettes. She started smoking about 4 years ago. She smoked 0.50 packs per day. She has never used smokeless tobacco. She reports current alcohol use. She reports current drug use. Drug: Marijuana Moon Diop). Reporting occasional marijuana       Family History:   family history is not on file. Mom crohn's disease        PHYSICAL EXAM:  Vitals:  /61   Pulse 67   Temp 98.2 °F (36.8 °C) (Oral)   Resp 16   Ht 5' 2\" (1.575 m)   Wt 135 lb 12.9 oz (61.6 kg)   LMP 11/23/2021   SpO2 98%   BMI 24.84 kg/m²     General Appearance: alert and oriented to person, place and time   Skin: warm and dry  Head: normocephalic and atraumatic  Eyes: pupils equal, round, and reactive to light, extraocular eye movements intact, conjunctivae normal  Neck: neck supple and non tender without mass   Pulmonary/Chest: diminished throughout to auscultation   Cardiovascular: normal rate, normal S1 and S2 and no carotid bruits  Abdomen: soft, non-tender, non-distended, normal bowel sounds, no masses or organomegaly  Extremities: no cyanosis, no clubbing and no edema  Neurologic: no cranial nerve deficit and speech normal        LABS:  Recent Labs     12/07/21  1413      K 4.2      CO2 23   BUN 6   CREATININE 0.8   GLUCOSE 113*   CALCIUM 9.6       Recent Labs     12/07/21  1413   WBC 10.5   RBC 4.73   HGB 14.1   HCT 42.0   MCV 88.8   MCH 29.8   MCHC 33.6   RDW 12.6      MPV 9.5       No results for input(s): POCGLU in the last 72 hours. Radiology:   No orders to display         ASSESSMENT:      Active Problems:    Small bowel obstruction (HCC)  Resolved Problems:    * No resolved hospital problems. *      PLAN:    1.  Intractable abdominal pain/ nausea/ vomiting ? crohn's : Pt presented to [place] ER with abdominal pain/ nausea/ vomiting: CT abdomen showing wall thickening within ileum suggestive of effort required to complete activities      Transfers  Transfer Comments: RUBI, pt unable to complete sit > stand transfer with face to face assistance         Cognition  Overall Cognitive Status: Exceptions  Arousal/Alertness: Delayed responses to stimuli  Memory: Decreased recall of recent events  Safety Judgement: Decreased awareness of need for assistance;Decreased awareness of need for safety  Insights: Decreased awareness of deficits  Initiation: Requires cues for some  Sequencing: Requires cues for some  Cognition Comment: increased time required to answer questions    Perception  Overall Perceptual Status: [facility]     Plan   Plan  Times per week: 3-5x/wk   Current Treatment Recommendations: Functional Mobility Training, Endurance Training, Safety Education & Training, Patient/Caregiver Education & Training, Equipment Evaluation, Education, & procurement, Self-Care / ADL    Goals  Short term goals  Time Frame for Short term goals: by discharge, pt will  Short term goal 1: demo I in UE ADL activities  Short term goal 2: demo mod I for LE ADL activities with AD as needed  Short term goal 3: demo I in functional transfers/ mobility with LRD to assist with ADL/ functional activities   Short term goal 4: demo increased activity tolerance of 30+ min to assist with ADL/ functional activities   Short term goal 5: demo understanding and I use of ECWS, fall prevention and proper pursed lipped breathing tech during functional activites   Patient Goals   Patient goals : to go home        Therapy Time   Individual Concurrent Group Co-treatment   Time In 1052         Time Out 1145         Minutes 53         Timed Code Treatment Minutes: 53 Minutes   See above for LOF. RN reports patient is medically stable for therapy treatment this date. Chart reviewed prior to treatment and patient is agreeable for therapy. All lines intact and patient positioned comfortably at end of treatment.   All patient needs addressed prior to partial small bowel obstruction. Possible crohn's disease. Differential diagnosis could include infection such as Yersinia and salmonella as well as vasculitis. GI consulted. Started on clear liquids and tolerating. Has not had BM. Stool studies ordered. Reporting + flatus and nausea resolving. Mom at bedside reporting she herself has crohn ans has had issues with sbo, fistulas and has needed colostomy in past. Await GI input. Monitor symptoms. 2. Possible partial SBO: tolerating liquids. + flatus. No BM. Continue IVF    3. H/o ADD: appears not on any meds     4. Weight loss: reporting 50 lb weight loss over 3 years     5. Occasional marijuana use         Code Status: FULL  DVT prophylaxis: lovenox      NOTE: This report was transcribed using voice recognition software. Every effort was made to ensure accuracy; however, inadvertent computerized transcription errors may be present.   Electronically signed by JOSE ALBERTO Russ on 12/8/2021 at 8:36 AM

## 2021-12-08 NOTE — CONSULTS
Jordyn Campuzano M.D. [facility]  Dr. Britt Zhang M.D.,  Dr. Savana Crockett M.D.,  Dr. Mara Doran D.O.,  Dr. Daniel Bolanos D.O. ,  Dr. Hugo Cyr M.D.,  Dr. Ayanna Maurice D.O. Rafa Rivera  36 y.o.  female      Re: ? Crohn's  Requesting physician: Dr. Blanco Farrar  Date:3:35 PM 12/8/2021          HPI: This is a 53-year-old female patient presenting to the hospital yesterday with chief complaint of upper abdominal pain. Patient reports that the night prior to presenting to the emergency department (12/6) she had some [place] food and some imitation crab meat which made her stomach upset. Patient had several episodes of nonbloody emesis as well as some bowel movements with feeling of incomplete voiding. Patient denies blood in the emesis or blood in the stool. She reports pain to be localized mostly in the mid upper abdomen initially which is now resolved and patient is experiencing only mild discomfort located in the lower abdomen. Patient reports previously having some vague abdominal symptoms and having been experiencing some weight loss in the past 2 years. She has positive family history of Crohn's disease in her mother with her aunt also having some type of colitis. However patient herself has not been formally diagnosed with any GI disorder. Upon presentation CT scan of the abdomen and pelvis was obtained revealing wall thickening in the ileum consistent with possible partial small bowel obstruction. Chemistry panel is unremarkable with preserved renal function and unremarkable electrolytes. Liver profile reveals nonelevated bilirubin, nonelevated transaminase and nonelevated alkaline phosphatase. White blood cell count is also nonelevated with no anemia and preserved platelet count.     Information sources:   -Patient  -family (mother)  -medical record  -health care team    PMHx:  Past Medical History:   Diagnosis Date    ADD (attention deficit disorder with hyperactivity)     Right ACL tear        PSHx:  Past Surgical History:   Procedure Laterality Date     SECTION  2013    KNEE ARTHROSCOPY Right 2018    acl reconstruction  medial meniscus repair       Meds:  Current Facility-Administered Medications   Medication Dose Route Frequency Provider Last Rate Last Admin    lactated ringers infusion   IntraVENous Continuous Samer MD Ricardo 100 mL/hr at 21 0655 New Bag at 21 0655    morphine (PF) injection 1 mg  1 mg IntraVENous Q6H PRN Chery Barnett MD        [facility]ron [facility] injection 4 mg  4 mg IntraVENous Q6H PRN Chery Barnett MD           SocHx:  Social History     Socioeconomic History    Marital status: Single     Spouse name: Not on file    Number of children: Not on file    Years of education: Not on file    Highest education level: Not on file   Occupational History    Not on file   Tobacco Use    Smoking status: Former Smoker     Packs/day: 0.50     Types: Cigarettes     Start date: 2017    Smokeless tobacco: Never Used   Substance and Sexual Activity    Alcohol use: Yes     Comment: socially    Drug use: Yes     Types: Marijuana Berneta Thony)     Comment: occassional    Sexual activity: Not on file   Other Topics Concern    Not on file   Social History Narrative    Not on file     Social Determinants of Health     Financial Resource Strain:     Difficulty of Paying Living Expenses: Not on file   Food Insecurity:     Worried About [address] in the Last Year: Not on file    Floyd of Food in the Last Year: Not on file   Transportation Needs:     Lack of Transportation (Medical): Not on file    Lack of Transportation (Non-Medical):  Not on file   Physical Activity:     Days of Exercise per Week: Not on file    Minutes of Exercise per Session: Not on file   Stress:     Feeling of Stress : Not on file   Social Connections:     Frequency of Communication with Friends and Family: Not on file    Frequency of Social Gatherings with Friends and Family: Not on file    Attends Protestant Services: Not on file    Active Member of Clubs or Organizations: Not on file    Attends Club or Organization Meetings: Not on file    Marital Status: Not on file   Intimate Partner Violence:     Fear of Current or Ex-Partner: Not on file    Emotionally Abused: Not on file    Physically Abused: Not on file    Sexually Abused: Not on file   Housing Stability:     Unable to Pay for Housing in the Last Year: Not on file    Number of Places Lived in the Last Year: Not on file    Unstable Housing in the Last Year: Not on file       FamHx:No family history on file. Allergy:No Known Allergies      ROS: As described in the HPI and in addition is negative upon detailed review of systems or unobtainable unless otherwise stated in this dictation. PE:  /66   Pulse 73   Temp 98 °F (36.7 °C)   Resp 16   Ht 5' 2\" (1.575 m)   Wt 135 lb 12.9 oz (61.6 kg)   LMP 11/23/2021   SpO2 98%   BMI 24.84 kg/m²     Gen.: NAD/ female  Head: Atraumatic/normocephalic   Eyes: EOMI/anicteric sclera  ENT: Moist oral mucosa/no discharge from nose or ears  Neck: Supple/trachea is midline  Chest: Symmetric excursion/now with respirations  Cor: Regular  Abd.: Not distended/tender in the lower abdomen without guarding  Extr.:  No peripheral edema  Muscles:  Tone and bulk: Consistent with age and  Skin: Warm and dry/anicteric      DATA:     Lab Results   Component Value Date    WBC 10.5 12/07/2021    RBC 4.73 12/07/2021    HGB 14.1 12/07/2021    HCT 42.0 12/07/2021    MCV 88.8 12/07/2021    MCH 29.8 12/07/2021    MCHC 33.6 12/07/2021    RDW 12.6 12/07/2021     12/07/2021    MPV 9.5 12/07/2021     Lab Results   Component Value Date     12/07/2021    K 4.2 12/07/2021     12/07/2021    CO2 23 12/07/2021    BUN 6 12/07/2021    CREATININE 0.8 12/07/2021    CALCIUM 9.6 12/07/2021    PROT 7.5 12/07/2021    LABALBU 4.3 12/07/2021    BILITOT 0.7 12/07/2021    ALKPHOS 70 12/07/2021    AST 21 12/07/2021    ALT 13 12/07/2021     Lab Results   Component Value Date    LIPASE 27 12/07/2021     No results found for: AMYLASE      ASSESSMENT/PLAN:    1. Abdominal pain/partial small bowel  -Abnormal CT scan on presentation  -Positive family history for IBD with personal unintentional weight loss  -Quickly resolving symptoms  -Differential diagnosis includes mostly infectious versus inflammatory  -Continue oral steroids as patient appears to have a self-limiting symptoms  -Further investigation with nonemergent outpatient colonoscopy is recommended  -Obtain stool studies in case of diarrhea  -Advance diet and if tolerated consider discharge for outpatient follow-up    Above has been discussed with patient and her mother at bedside and all questions answered to their satisfaction. They are agreeable with the plan as delineated. Thank you for the opportunity to see this patient in consultation    Nano Waller MD  12/8/2021  3:35 PM    NOTE:  This report was transcribed using voice recognition software. Every effort was made to ensure accuracy; however, inadvertent computerized transcription errors may be present. Patient seen and examined independently. Discussed with Dr. Wiley Navarro and agree with the plan of care stated above.     Ian Looney DO  12/9/2021  2:41 PM

## 2021-12-08 NOTE — CARE COORDINATION
Chart reviewed. Pt admitted for abdominal pain. GI consulted- await eval and further plan of care. Pt independent, from home PTA. Anticipate no discharge needs.

## 2021-12-09 VITALS
HEART RATE: 60 BPM | WEIGHT: 146.8 LBS | RESPIRATION RATE: 16 BRPM | TEMPERATURE: 98 F | BODY MASS INDEX: 27.02 KG/M2 | SYSTOLIC BLOOD PRESSURE: 112 MMHG | HEIGHT: 62 IN | OXYGEN SATURATION: 99 % | DIASTOLIC BLOOD PRESSURE: 62 MMHG

## 2021-12-09 LAB
ALBUMIN SERPL-MCNC: 1.8 G/DL (ref 3.5–5.2)
ALBUMIN SERPL-MCNC: 3.5 G/DL (ref 3.5–5.2)
ALP BLD-CCNC: 26 U/L (ref 35–104)
ALP BLD-CCNC: 51 U/L (ref 35–104)
ALT SERPL-CCNC: 10 U/L (ref 0–32)
ALT SERPL-CCNC: 6 U/L (ref 0–32)
ANION GAP SERPL CALCULATED.3IONS-SCNC: 17 MMOL/L (ref 7–16)
ANION GAP SERPL CALCULATED.3IONS-SCNC: 8 MMOL/L (ref 7–16)
AST SERPL-CCNC: 10 U/L (ref 0–31)
AST SERPL-CCNC: 24 U/L (ref 0–31)
BASOPHILS ABSOLUTE: 0.04 E9/L (ref 0–0.2)
BASOPHILS RELATIVE PERCENT: 0.7 % (ref 0–2)
BILIRUB SERPL-MCNC: 0.3 MG/DL (ref 0–1.2)
BILIRUB SERPL-MCNC: <0.2 MG/DL (ref 0–1.2)
BUN BLDV-MCNC: 3 MG/DL (ref 6–20)
BUN BLDV-MCNC: 6 MG/DL (ref 6–20)
CALCIUM SERPL-MCNC: 7.1 MG/DL (ref 8.6–10.2)
CALCIUM SERPL-MCNC: 8.4 MG/DL (ref 8.6–10.2)
CHLORIDE BLD-SCNC: 106 MMOL/L (ref 98–107)
CHLORIDE BLD-SCNC: 107 MMOL/L (ref 98–107)
CO2: 13 MMOL/L (ref 22–29)
CO2: 25 MMOL/L (ref 22–29)
CREAT SERPL-MCNC: 0.4 MG/DL (ref 0.5–1)
CREAT SERPL-MCNC: 0.7 MG/DL (ref 0.5–1)
EOSINOPHILS ABSOLUTE: 0.13 E9/L (ref 0.05–0.5)
EOSINOPHILS RELATIVE PERCENT: 2.2 % (ref 0–6)
GFR AFRICAN AMERICAN: >60
GFR AFRICAN AMERICAN: >60
GFR NON-AFRICAN AMERICAN: >60 ML/MIN/1.73
GFR NON-AFRICAN AMERICAN: >60 ML/MIN/1.73
GIARDIA ANTIGEN STOOL: NORMAL
GLUCOSE BLD-MCNC: 60 MG/DL (ref 74–99)
GLUCOSE BLD-MCNC: 88 MG/DL (ref 74–99)
HCT VFR BLD CALC: 26.8 % (ref 34–48)
HCT VFR BLD CALC: 35.3 % (ref 34–48)
HEMOGLOBIN: 11.4 G/DL (ref 11.5–15.5)
HEMOGLOBIN: 8.6 G/DL (ref 11.5–15.5)
IMMATURE GRANULOCYTES #: 0.02 E9/L
IMMATURE GRANULOCYTES %: 0.3 % (ref 0–5)
LYMPHOCYTES ABSOLUTE: 1.42 E9/L (ref 1.5–4)
LYMPHOCYTES RELATIVE PERCENT: 24.4 % (ref 20–42)
MCH RBC QN AUTO: 29.5 PG (ref 26–35)
MCH RBC QN AUTO: 30.2 PG (ref 26–35)
MCHC RBC AUTO-ENTMCNC: 32.1 % (ref 32–34.5)
MCHC RBC AUTO-ENTMCNC: 32.3 % (ref 32–34.5)
MCV RBC AUTO: 91.5 FL (ref 80–99.9)
MCV RBC AUTO: 94 FL (ref 80–99.9)
MONOCYTES ABSOLUTE: 0.41 E9/L (ref 0.1–0.95)
MONOCYTES RELATIVE PERCENT: 7 % (ref 2–12)
NEUTROPHILS ABSOLUTE: 3.8 E9/L (ref 1.8–7.3)
NEUTROPHILS RELATIVE PERCENT: 65.4 % (ref 43–80)
PDW BLD-RTO: 12.9 FL (ref 11.5–15)
PDW BLD-RTO: 13.1 FL (ref 11.5–15)
PLATELET # BLD: 162 E9/L (ref 130–450)
PLATELET # BLD: 240 E9/L (ref 130–450)
PMV BLD AUTO: 10 FL (ref 7–12)
PMV BLD AUTO: 9.7 FL (ref 7–12)
POTASSIUM REFLEX MAGNESIUM: 4 MMOL/L (ref 3.5–5)
POTASSIUM SERPL-SCNC: 3.5 MMOL/L (ref 3.5–5)
RBC # BLD: 2.85 E12/L (ref 3.5–5.5)
RBC # BLD: 3.86 E12/L (ref 3.5–5.5)
SODIUM BLD-SCNC: 136 MMOL/L (ref 132–146)
SODIUM BLD-SCNC: 140 MMOL/L (ref 132–146)
TOTAL PROTEIN: 3.2 G/DL (ref 6.4–8.3)
TOTAL PROTEIN: 6.2 G/DL (ref 6.4–8.3)
WBC # BLD: 3.5 E9/L (ref 4.5–11.5)
WBC # BLD: 5.8 E9/L (ref 4.5–11.5)
WHITE BLOOD CELLS (WBC), STOOL: NORMAL

## 2021-12-09 PROCEDURE — 2580000003 HC RX 258: Performed by: INTERNAL MEDICINE

## 2021-12-09 PROCEDURE — 36415 COLL VENOUS BLD VENIPUNCTURE: CPT

## 2021-12-09 PROCEDURE — 99239 HOSP IP/OBS DSCHRG MGMT >30: CPT | Performed by: STUDENT IN AN ORGANIZED HEALTH CARE EDUCATION/TRAINING PROGRAM

## 2021-12-09 PROCEDURE — 85025 COMPLETE CBC W/AUTO DIFF WBC: CPT

## 2021-12-09 PROCEDURE — 80053 COMPREHEN METABOLIC PANEL: CPT

## 2021-12-09 PROCEDURE — 85027 COMPLETE CBC AUTOMATED: CPT

## 2021-12-09 PROCEDURE — APPSS45 APP SPLIT SHARED TIME 31-45 MINUTES: Performed by: NURSE PRACTITIONER

## 2021-12-09 RX ADMIN — SODIUM CHLORIDE, POTASSIUM CHLORIDE, SODIUM LACTATE AND CALCIUM CHLORIDE: 600; 310; 30; 20 INJECTION, SOLUTION INTRAVENOUS at 05:23

## 2021-12-09 ASSESSMENT — PAIN SCALES - GENERAL: PAINLEVEL_OUTOF10: 0

## 2021-12-09 NOTE — PROGRESS NOTES
Paged Dr Mary Hopper via office to check for d/c     [phone] Jalyn Lawler for d/c per GI no need for OP steroids. F/u in office for OP Cscope.

## 2021-12-09 NOTE — DISCHARGE INSTR - DIET

## 2021-12-09 NOTE — DISCHARGE SUMMARY
[facility] Physician Discharge Summary       PCP    Schedule an appointment as soon as possible for a visit in 1 week  Make an appointment in 1 week to go over hospitalization, medications and follow up. Zbigniew Byers MD  [facility] [address] [place] [address] Cristiane  [phone]      Keep appointment scheduled with GI team.      Activity level: As tolerated     Dispo: Home    Condition on discharge: Stable     Patient ID:  Kimberly Mock  [id number]  46 y.o.  1990    Admit date: 12/8/2021    Discharge date and time:  12/9/2021  1:32 PM    Admission Diagnoses: Active Problems:    Small bowel obstruction (HCC)  Resolved Problems:    * No resolved hospital problems. *      Discharge Diagnoses: Active Problems:    Small bowel obstruction (HCC)  Resolved Problems:    * No resolved hospital problems. *      Consults:  IP CONSULT TO GI    Hospital Course:   Patient Kimberly Mock is a 36 y.o. presented with Small bowel obstruction [facility]) [V25.033]   Patient presented to the ER at [facility] with c/o abdominal pain/ nausea/ vomiting. She was then transferred to [address] [place] [address] for GI evaluation. She was followed and treated for;       1. Intractable abdominal pain/ nausea/ vomiting ? Crohn's- resolving : Pt presented to [place] ER with abdominal pain/ nausea/ vomiting: CT abdomen showing wall thickening within ileum suggestive of partial small bowel obstruction. Possible crohn's disease. Differential diagnosis could include infection such as Yersinia and salmonella as well as vasculitis. GI consulted. Pt was started on clear liquids and tolerated diet advanved. Abdominal pain/ n/ v resolving,  Stool studies ordered. Reporting + flatus and nausea resolving. Mom at bedside reporting she herself has crohn ans has had issues with sbo, fistulas and has needed colostomy in past. GI consulted- appreciate input. Reporting quickly resolving symptoms. Plan is for outpt colonoscopy.    2. Possible partial SBO: tolerating liquids diet advanced and tolerating. + flatus. Pt having BMs Continue IVF- resolving. Pt tolerating PO an abdominal pain/ n/ v resolving.      3. H/o ADD: appears not on any meds      4. Weight loss: reporting 50 lb weight loss over 3 years      5. Occasional marijuana use       PT was feeling better on day of discharge and eager to go home. She was then discharged in stable condition with the following medications, instructions and follow up. Discharge Exam:  General Appearance: alert and oriented to person, place and time and in no acute distress  Skin: warm and dry  Head: normocephalic and atraumatic  Neck: neck supple and non tender without mass   Pulmonary/Chest: clear to auscultation bilaterally-   Cardiovascular: normal rate, normal S1 and S2 and no carotid bruits  Abdomen: soft, non-tender, non-distended, normal bowel sounds,  Extremities: no cyanosis, no clubbing and no edema  Neurologic: speech normal     No intake/output data recorded. No intake/output data recorded. LABS:  Recent Labs     12/07/21  1413 12/09/21  0714 12/09/21  1100    136 140   K 4.2 4.0 3.5    106 107   CO2 23 13* 25   BUN 6 3* 6   CREATININE 0.8 0.4* 0.7   GLUCOSE 113* 60* 88   CALCIUM 9.6 7.1* 8.4*       Recent Labs     12/07/21  1413 12/09/21  0714 12/09/21  1100   WBC 10.5 3.5* 5.8   RBC 4.73 2.85* 3.86   HGB 14.1 8.6* 11.4*   HCT 42.0 26.8* 35.3   MCV 88.8 94.0 91.5   MCH 29.8 30.2 29.5   MCHC 33.6 32.1 32.3   RDW 12.6 13.1 12.9    162 240   MPV 9.5 10.0 9.7       No results for input(s): POCGLU in the last 72 hours. Imaging:  No results found. Patient Instructions:      Medication List      You have not been prescribed any medications.            Note that more than 30 minutes was spent in preparing discharge papers, discussing discharge with patient, medication review, etc.    Signed:  Electronically signed by JOSE ALBERTO Gallo on 12/9/2021 at 1:32 PM

## 2021-12-10 LAB — CULTURE, STOOL: NORMAL

## 2021-12-13 LAB — CALPROTECTIN, FECAL: 133 UG/G

## 2022-04-19 ENCOUNTER — HOSPITAL ENCOUNTER (OUTPATIENT)
Dept: CT IMAGING | Age: 32
Discharge: HOME | End: 2022-04-21
Payer: COMMERCIAL

## 2022-04-19 ENCOUNTER — HOSPITAL ENCOUNTER (OUTPATIENT)
Age: 32
Discharge: HOME | End: 2022-04-19
Payer: COMMERCIAL

## 2022-04-19 DIAGNOSIS — K52.9 INFLAMMATORY BOWEL DISEASE: ICD-10-CM

## 2022-04-19 DIAGNOSIS — R93.811 ABNORMAL FINDING ON DIAGNOSTIC IMAGING OF RIGHT TESTICLE: ICD-10-CM

## 2022-04-19 LAB
ALBUMIN SERPL-MCNC: 4.4 G/DL (ref 3.5–5.2)
ALP BLD-CCNC: 68 U/L (ref 35–104)
ALT SERPL-CCNC: 13 U/L (ref 0–32)
ANION GAP SERPL CALCULATED.3IONS-SCNC: 12 MMOL/L (ref 7–16)
AST SERPL-CCNC: 19 U/L (ref 0–31)
BASOPHILS ABSOLUTE: 0.05 E9/L (ref 0–0.2)
BASOPHILS RELATIVE PERCENT: 0.8 % (ref 0–2)
BILIRUB SERPL-MCNC: 0.4 MG/DL (ref 0–1.2)
BUN BLDV-MCNC: 10 MG/DL (ref 6–20)
C-REACTIVE PROTEIN: 0.4 MG/DL (ref 0–0.4)
CALCIUM SERPL-MCNC: 9.3 MG/DL (ref 8.6–10.2)
CHLORIDE BLD-SCNC: 101 MMOL/L (ref 98–107)
CO2: 24 MMOL/L (ref 22–29)
CREAT SERPL-MCNC: 0.8 MG/DL (ref 0.5–1)
EOSINOPHILS ABSOLUTE: 0.22 E9/L (ref 0.05–0.5)
EOSINOPHILS RELATIVE PERCENT: 3.5 % (ref 0–6)
FERRITIN: 24 NG/ML
FOLATE: 19.8 NG/ML (ref 4.8–24.2)
GFR AFRICAN AMERICAN: >60
GFR NON-AFRICAN AMERICAN: >60 ML/MIN/1.73
GLUCOSE BLD-MCNC: 116 MG/DL (ref 74–99)
HCT VFR BLD CALC: 38.7 % (ref 34–48)
HEMOGLOBIN: 13 G/DL (ref 11.5–15.5)
IMMATURE GRANULOCYTES #: 0.02 E9/L
IMMATURE GRANULOCYTES %: 0.3 % (ref 0–5)
IRON SATURATION: 27 % (ref 15–50)
IRON: 94 MCG/DL (ref 37–145)
LYMPHOCYTES ABSOLUTE: 1.32 E9/L (ref 1.5–4)
LYMPHOCYTES RELATIVE PERCENT: 20.8 % (ref 20–42)
MCH RBC QN AUTO: 29.7 PG (ref 26–35)
MCHC RBC AUTO-ENTMCNC: 33.6 % (ref 32–34.5)
MCV RBC AUTO: 88.4 FL (ref 80–99.9)
MONOCYTES ABSOLUTE: 0.49 E9/L (ref 0.1–0.95)
MONOCYTES RELATIVE PERCENT: 7.7 % (ref 2–12)
NEUTROPHILS ABSOLUTE: 4.26 E9/L (ref 1.8–7.3)
NEUTROPHILS RELATIVE PERCENT: 66.9 % (ref 43–80)
PDW BLD-RTO: 12.7 FL (ref 11.5–15)
PLATELET # BLD: 293 E9/L (ref 130–450)
PMV BLD AUTO: 9.9 FL (ref 7–12)
POTASSIUM SERPL-SCNC: 3.9 MMOL/L (ref 3.5–5)
RBC # BLD: 4.38 E12/L (ref 3.5–5.5)
SEDIMENTATION RATE, ERYTHROCYTE: 19 MM/HR (ref 0–20)
SODIUM BLD-SCNC: 137 MMOL/L (ref 132–146)
TOTAL IRON BINDING CAPACITY: 350 MCG/DL (ref 250–450)
TOTAL PROTEIN: 7.5 G/DL (ref 6.4–8.3)
TSH SERPL DL<=0.05 MIU/L-ACNC: 0.81 UIU/ML (ref 0.27–4.2)
VITAMIN B-12: 556 PG/ML (ref 211–946)
WBC # BLD: 6.4 E9/L (ref 4.5–11.5)

## 2022-04-19 PROCEDURE — 36415 COLL VENOUS BLD VENIPUNCTURE: CPT

## 2022-04-19 PROCEDURE — 86703 HIV-1/HIV-2 1 RESULT ANTBDY: CPT

## 2022-04-19 PROCEDURE — 82746 ASSAY OF FOLIC ACID SERUM: CPT

## 2022-04-19 PROCEDURE — 85651 RBC SED RATE NONAUTOMATED: CPT

## 2022-04-19 PROCEDURE — 84443 ASSAY THYROID STIM HORMONE: CPT

## 2022-04-19 PROCEDURE — 6360000004 HC RX CONTRAST MEDICATION: Performed by: RADIOLOGY

## 2022-04-19 PROCEDURE — 87340 HEPATITIS B SURFACE AG IA: CPT

## 2022-04-19 PROCEDURE — 82607 VITAMIN B-12: CPT

## 2022-04-19 PROCEDURE — 85025 COMPLETE CBC W/AUTO DIFF WBC: CPT

## 2022-04-19 PROCEDURE — 2580000003 HC RX 258: Performed by: RADIOLOGY

## 2022-04-19 PROCEDURE — 86704 HEP B CORE ANTIBODY TOTAL: CPT

## 2022-04-19 PROCEDURE — 86706 HEP B SURFACE ANTIBODY: CPT

## 2022-04-19 PROCEDURE — 83540 ASSAY OF IRON: CPT

## 2022-04-19 PROCEDURE — 2500000003 HC RX 250 WO HCPCS: Performed by: RADIOLOGY

## 2022-04-19 PROCEDURE — 86140 C-REACTIVE PROTEIN: CPT

## 2022-04-19 PROCEDURE — 80053 COMPREHEN METABOLIC PANEL: CPT

## 2022-04-19 PROCEDURE — 74177 CT ABD & PELVIS W/CONTRAST: CPT

## 2022-04-19 PROCEDURE — 82728 ASSAY OF FERRITIN: CPT

## 2022-04-19 PROCEDURE — 83550 IRON BINDING TEST: CPT

## 2022-04-19 RX ORDER — SODIUM CHLORIDE 0.9 % (FLUSH) 0.9 %
10 SYRINGE (ML) INJECTION
Status: COMPLETED | OUTPATIENT
Start: 2022-04-19 | End: 2022-04-19

## 2022-04-19 RX ADMIN — Medication 10 ML: at 17:28

## 2022-04-19 RX ADMIN — IOPAMIDOL 90 ML: 755 INJECTION, SOLUTION INTRAVENOUS at 17:27

## 2022-04-19 RX ADMIN — BARIUM SULFATE 450 ML: 1 SUSPENSION ORAL at 17:28

## 2022-04-20 LAB
HBV SURFACE AB TITR SER: REACTIVE {TITER}
HEPATITIS B SURFACE ANTIGEN INTERPRETATION: NORMAL
HIV-1 AND HIV-2 ANTIBODIES: NORMAL

## 2022-04-21 LAB — HEPATITIS B CORE TOTAL ANTIBODY: NONREACTIVE

## 2022-08-18 ENCOUNTER — HOSPITAL ENCOUNTER (OUTPATIENT)
Age: 32
Discharge: HOME | End: 2022-08-18
Payer: COMMERCIAL

## 2022-08-18 PROCEDURE — 86481 TB AG RESPONSE T-CELL SUSP: CPT

## 2022-08-18 PROCEDURE — 36415 COLL VENOUS BLD VENIPUNCTURE: CPT

## 2022-08-21 LAB
COMMENT: NORMAL
REPORT: NORMAL

## 2023-01-19 ENCOUNTER — APPOINTMENT (OUTPATIENT)
Dept: GENERAL RADIOLOGY | Age: 33
End: 2023-01-19
Payer: COMMERCIAL

## 2023-01-19 ENCOUNTER — HOSPITAL ENCOUNTER (EMERGENCY)
Age: 33
Discharge: HOME | End: 2023-01-19
Payer: COMMERCIAL

## 2023-01-19 VITALS
TEMPERATURE: 98.2 F | WEIGHT: 135 LBS | HEIGHT: 62 IN | OXYGEN SATURATION: 100 % | HEART RATE: 62 BPM | BODY MASS INDEX: 24.84 KG/M2 | RESPIRATION RATE: 16 BRPM | SYSTOLIC BLOOD PRESSURE: 135 MMHG | DIASTOLIC BLOOD PRESSURE: 72 MMHG

## 2023-01-19 DIAGNOSIS — L03.113 CELLULITIS OF RIGHT HAND: Primary | ICD-10-CM

## 2023-01-19 PROCEDURE — 99283 EMERGENCY DEPT VISIT LOW MDM: CPT

## 2023-01-19 PROCEDURE — 73130 X-RAY EXAM OF HAND: CPT

## 2023-01-19 RX ORDER — PREDNISONE 20 MG/1
TABLET ORAL
Qty: 18 TABLET | Refills: 0 | Status: SHIPPED | OUTPATIENT
Start: 2023-01-19 | End: 2023-01-29

## 2023-01-19 RX ORDER — DOXYCYCLINE HYCLATE 100 MG
100 TABLET ORAL 2 TIMES DAILY
Qty: 14 TABLET | Refills: 0 | Status: SHIPPED | OUTPATIENT
Start: 2023-01-19 | End: 2023-01-26

## 2023-01-19 ASSESSMENT — PAIN DESCRIPTION - PAIN TYPE: TYPE: ACUTE PAIN

## 2023-01-19 ASSESSMENT — PAIN DESCRIPTION - FREQUENCY: FREQUENCY: CONTINUOUS

## 2023-01-19 ASSESSMENT — PAIN DESCRIPTION - ORIENTATION: ORIENTATION: RIGHT

## 2023-01-19 ASSESSMENT — PAIN DESCRIPTION - ONSET: ONSET: PROGRESSIVE

## 2023-01-19 ASSESSMENT — PAIN DESCRIPTION - LOCATION: LOCATION: HAND;FINGER (COMMENT WHICH ONE)

## 2023-01-19 ASSESSMENT — PAIN SCALES - GENERAL: PAINLEVEL_OUTOF10: 3

## 2023-01-19 ASSESSMENT — PAIN - FUNCTIONAL ASSESSMENT: PAIN_FUNCTIONAL_ASSESSMENT: 0-10

## 2023-01-19 NOTE — ED PROVIDER NOTES
Independent [facility] Visit. Department of Emergency Medicine   ED  Encounter Note  Admit Date/RoomTime: 2023  9:52 AM  ED Room:     NAME: Rosalio Chahal  : 1990  MRN: [id number]     Chief Complaint:  Swelling (Right hand and finger pain and swelling started 2 days ago. )    History of Present Illness       Rosalio Chahal is a 29 y.o. old female presenting to the emergency department by private vehicle, for non-traumatic Right 2nd MCP pain which occured 2 day(s) prior to arrival.  Since onset the symptoms have been waxing and waning. Her pain is aggraveated by any movement or pressure on or palpation of painful area and relieved by OTC NSAIDS. She denies any fever, chills, night sweats, chest pain, shortness of breath, nausea, vomiting, diarrhea, numbness/weakness of her extremities, or any likelihood that she may be pregnant. ROS   Pertinent positives and negatives are stated within HPI, all other systems reviewed and are negative. Past Medical History:  has a past medical history of ADD (attention deficit disorder with hyperactivity), Bowel obstruction ([address] Crohn's disease ([facility] [address] and Right ACL tear. Surgical History:  has a past surgical history that includes  section () and Knee arthroscopy (Right, 2018). Social History:  reports that she has quit smoking. Her smoking use included cigarettes. She started smoking about 5 years ago. She smoked an average of .5 packs per day. She has never used smokeless tobacco. She reports current alcohol use. She reports current drug use. Drug: Marijuana Maryam Beat). Family History: family history is not on file. Allergies: Patient has no known allergies.     Physical Exam   Oxygen Saturation Interpretation: Normal.        ED Triage Vitals   BP Temp Temp Source Heart Rate Resp SpO2 Height Weight   23 0950 23 0950 23 0950 23 0950 23 0950 23 0950 23 0955 23 0955   135/72 98.2 °F (36.8 °C) Oral 62 16 100 % 5' 2\" (1.575 m) 135 lb (61.2 kg)       Constitutional:  Alert, development consistent with age. Neck:  Normal ROM. Supple. Non-tender. Physical Exam  Hand: Right dorsal & volar 2nd MCP joint. Tenderness: moderate. Swelling: Moderate. Deformity: no deformity observed/palpated. Skin: Moderate swelling and erythema of the right second metacarpophalangeal joint. No open wounds. No lymphatic streaking. Neurovascular: Motor deficit: none. Decreased strength testing to the right second digit secondary to pain. Sensory deficit:   none. Pulse deficit: none. Capillary refill: normal.  Fingers:  2nd            Tenderness:  mild. Swelling: Mild. Deformity: no deformity observed/palpated. ROM: full range of motion. Skin:  no wounds, erythema, or swelling. Wrist:  diffusely across carpal bones. Tenderness:  none. Swelling: None. Deformity: no deformity observed/palpated. ROM: full range of motion. Skin: no wounds, erythema, or swelling. Lymphatics: No lymphangitis or adenopathy noted. Neurological:  Oriented. Motor functions intact. t. Lab / Imaging Results   (All laboratory and radiology results have been personally reviewed by myself)  Labs:  No results found for this visit on 01/19/23. Imaging: All Radiology results interpreted by Radiologist unless otherwise noted. XR HAND RIGHT (MIN 3 VIEWS)   Final Result   Edema, contusion and/or cellulitis right 2nd digit particularly along the   posterior margin of the 2nd MCP joint. No underlying fracture or dislocation   however. ED Course / Medical Decision Making   Medications - No data to display     Re-examination:  1/19/23       Time: 1052   Results discussed. Patient is agreeable with the plan. Consult(s):   None    Procedure(s):  None    MDM:    Patient presents to the emergency department for pain and swelling of her right hand focused around the second metacarpophalangeal joint. Denies any injury. Radiographs are obtained and interpreted by the radiologist as with edema, contusion and/or cellulitis right second digit particularly along the posterior margin of the second MCP joint. Differential diagnosis does include traumatic injury, inflammatory arthritis such as gout, and cellulitis. Patient has no history of gout. After discussing the differential with her, and a joint decision-making process will provide her with a prescription for both steroids and doxycycline. Patient is well-appearing, with normal vital signs, and without additional complaints. Appropriate discharge and outpatient follow-up with her primary care provider. She was educated on all of her prescriptions. Return for new or worsening symptoms. Plan of Care/Counseling:  Dillon Stock PA-C reviewed today's visit with the patient in addition to providing specific details for the plan of care and counseling regarding the diagnosis and prognosis. Questions are answered at this time and are agreeable with the plan. Assessment      1. Cellulitis of right hand      Plan   Discharged home. Patient condition is good    New Medications     Discharge Medication List as of 1/19/2023 10:51 AM        START taking these medications    Details   predniSONE (DELTASONE) 20 MG tablet Sig.: Take 60mg  Po qd x 3 days, then 40mg po qd x3 days, then 20mg po qd x 3 days. QS x 9 days, Disp-18 tablet, R-0Print      doxycycline hyclate (VIBRA-TABS) 100 MG tablet Take 1 tablet by mouth 2 times daily for 7 days, Disp-14 tablet, R-0Normal           Electronically signed by Dillon Stock PA-C   DD: 1/19/23  **This report was transcribed using voice recognition software.  Every effort was made to ensure accuracy; however, inadvertent computerized transcription errors may be present.   END OF ED PROVIDER NOTE       Maury Quinones PA-C  01/19/23 1122

## 2023-01-19 NOTE — Clinical Note
Corona Rodriguez was seen and treated in our emergency department on 1/19/2023. She may return to work on 01/20/2023. If you have any questions or concerns, please don't hesitate to call.       Lanette Reynoso PA-C

## 2023-01-20 ENCOUNTER — HOSPITAL ENCOUNTER (OUTPATIENT)
Age: 33
Discharge: HOME | End: 2023-01-20
Payer: COMMERCIAL

## 2023-01-20 LAB
ALBUMIN SERPL-MCNC: 4.1 G/DL (ref 3.5–5.2)
ALP BLD-CCNC: 64 U/L (ref 35–104)
ALT SERPL-CCNC: 13 U/L (ref 0–32)
ANION GAP SERPL CALCULATED.3IONS-SCNC: 13 MMOL/L (ref 7–16)
AST SERPL-CCNC: 19 U/L (ref 0–31)
BASOPHILS ABSOLUTE: 0.05 E9/L (ref 0–0.2)
BASOPHILS RELATIVE PERCENT: 0.8 % (ref 0–2)
BILIRUB SERPL-MCNC: 0.5 MG/DL (ref 0–1.2)
BUN BLDV-MCNC: 14 MG/DL (ref 6–20)
CALCIUM SERPL-MCNC: 9.3 MG/DL (ref 8.6–10.2)
CHLORIDE BLD-SCNC: 104 MMOL/L (ref 98–107)
CO2: 23 MMOL/L (ref 22–29)
CREAT SERPL-MCNC: 0.8 MG/DL (ref 0.5–1)
EOSINOPHILS ABSOLUTE: 0.07 E9/L (ref 0.05–0.5)
EOSINOPHILS RELATIVE PERCENT: 1.1 % (ref 0–6)
FERRITIN: 16 NG/ML
GFR SERPL CREATININE-BSD FRML MDRD: >60 ML/MIN/1.73
GLUCOSE BLD-MCNC: 87 MG/DL (ref 74–99)
HCT VFR BLD CALC: 40.5 % (ref 34–48)
HEMOGLOBIN: 13.2 G/DL (ref 11.5–15.5)
IMMATURE GRANULOCYTES #: 0.02 E9/L
IMMATURE GRANULOCYTES %: 0.3 % (ref 0–5)
IRON SATURATION: 25 % (ref 15–50)
IRON: 97 MCG/DL (ref 37–145)
LYMPHOCYTES ABSOLUTE: 1.56 E9/L (ref 1.5–4)
LYMPHOCYTES RELATIVE PERCENT: 24.8 % (ref 20–42)
MCH RBC QN AUTO: 29.4 PG (ref 26–35)
MCHC RBC AUTO-ENTMCNC: 32.6 % (ref 32–34.5)
MCV RBC AUTO: 90.2 FL (ref 80–99.9)
MONOCYTES ABSOLUTE: 0.42 E9/L (ref 0.1–0.95)
MONOCYTES RELATIVE PERCENT: 6.7 % (ref 2–12)
NEUTROPHILS ABSOLUTE: 4.18 E9/L (ref 1.8–7.3)
NEUTROPHILS RELATIVE PERCENT: 66.3 % (ref 43–80)
PDW BLD-RTO: 12.9 FL (ref 11.5–15)
PLATELET # BLD: 265 E9/L (ref 130–450)
PMV BLD AUTO: 9.7 FL (ref 7–12)
POTASSIUM SERPL-SCNC: 4.1 MMOL/L (ref 3.5–5)
RBC # BLD: 4.49 E12/L (ref 3.5–5.5)
SODIUM BLD-SCNC: 140 MMOL/L (ref 132–146)
TOTAL IRON BINDING CAPACITY: 382 MCG/DL (ref 250–450)
TOTAL PROTEIN: 7.4 G/DL (ref 6.4–8.3)
TSH SERPL DL<=0.05 MIU/L-ACNC: 1.08 UIU/ML (ref 0.27–4.2)
WBC # BLD: 6.3 E9/L (ref 4.5–11.5)

## 2023-01-20 PROCEDURE — 83550 IRON BINDING TEST: CPT

## 2023-01-20 PROCEDURE — 80053 COMPREHEN METABOLIC PANEL: CPT

## 2023-01-20 PROCEDURE — 83540 ASSAY OF IRON: CPT

## 2023-01-20 PROCEDURE — 82728 ASSAY OF FERRITIN: CPT

## 2023-01-20 PROCEDURE — 85025 COMPLETE CBC W/AUTO DIFF WBC: CPT

## 2023-01-20 PROCEDURE — 36415 COLL VENOUS BLD VENIPUNCTURE: CPT

## 2023-01-20 PROCEDURE — 84443 ASSAY THYROID STIM HORMONE: CPT

## 2023-12-30 ENCOUNTER — HOSPITAL ENCOUNTER (EMERGENCY)
Age: 33
Discharge: HOME | End: 2023-12-30
Payer: COMMERCIAL

## 2023-12-30 VITALS
WEIGHT: 125 LBS | OXYGEN SATURATION: 98 % | HEART RATE: 76 BPM | BODY MASS INDEX: 23.6 KG/M2 | DIASTOLIC BLOOD PRESSURE: 80 MMHG | TEMPERATURE: 97.6 F | HEIGHT: 61 IN | RESPIRATION RATE: 16 BRPM | SYSTOLIC BLOOD PRESSURE: 122 MMHG

## 2023-12-30 DIAGNOSIS — K02.9 DENTAL DECAY: ICD-10-CM

## 2023-12-30 DIAGNOSIS — K08.89 PAIN, DENTAL: Primary | ICD-10-CM

## 2023-12-30 PROCEDURE — 6370000000 HC RX 637 (ALT 250 FOR IP): Performed by: NURSE PRACTITIONER

## 2023-12-30 PROCEDURE — 99283 EMERGENCY DEPT VISIT LOW MDM: CPT

## 2023-12-30 RX ORDER — CHLORHEXIDINE GLUCONATE ORAL RINSE 1.2 MG/ML
15 SOLUTION DENTAL 2 TIMES DAILY
Qty: 420 ML | Refills: 0 | Status: SHIPPED | OUTPATIENT
Start: 2023-12-30 | End: 2024-01-13

## 2023-12-30 RX ORDER — IBUPROFEN 400 MG/1
400 TABLET ORAL ONCE
Status: COMPLETED | OUTPATIENT
Start: 2023-12-30 | End: 2023-12-30

## 2023-12-30 RX ORDER — AMOXICILLIN AND CLAVULANATE POTASSIUM 875; 125 MG/1; MG/1
1 TABLET, FILM COATED ORAL ONCE
Status: COMPLETED | OUTPATIENT
Start: 2023-12-30 | End: 2023-12-30

## 2023-12-30 RX ORDER — IBUPROFEN 600 MG/1
600 TABLET ORAL 3 TIMES DAILY PRN
Qty: 30 TABLET | Refills: 0 | Status: SHIPPED | OUTPATIENT
Start: 2023-12-30

## 2023-12-30 RX ORDER — AMOXICILLIN AND CLAVULANATE POTASSIUM 875; 125 MG/1; MG/1
1 TABLET, FILM COATED ORAL 2 TIMES DAILY
Qty: 20 TABLET | Refills: 0 | Status: SHIPPED | OUTPATIENT
Start: 2023-12-30 | End: 2024-01-09

## 2023-12-30 RX ADMIN — IBUPROFEN 400 MG: 400 TABLET, FILM COATED ORAL at 20:13

## 2023-12-30 RX ADMIN — AMOXICILLIN AND CLAVULANATE POTASSIUM 1 TABLET: 875; 125 TABLET, FILM COATED ORAL at 20:09

## 2023-12-30 ASSESSMENT — PAIN SCALES - GENERAL
PAINLEVEL_OUTOF10: 5
PAINLEVEL_OUTOF10: 7
PAINLEVEL_OUTOF10: 6

## 2023-12-30 ASSESSMENT — PAIN DESCRIPTION - DESCRIPTORS
DESCRIPTORS: ACHING
DESCRIPTORS: ACHING
DESCRIPTORS: ACHING;THROBBING;DISCOMFORT

## 2023-12-30 ASSESSMENT — PAIN - FUNCTIONAL ASSESSMENT
PAIN_FUNCTIONAL_ASSESSMENT: ACTIVITIES ARE NOT PREVENTED
PAIN_FUNCTIONAL_ASSESSMENT: 0-10
PAIN_FUNCTIONAL_ASSESSMENT: 0-10

## 2023-12-30 ASSESSMENT — PAIN DESCRIPTION - ORIENTATION: ORIENTATION: RIGHT;LOWER

## 2023-12-30 ASSESSMENT — PAIN DESCRIPTION - PAIN TYPE
TYPE: ACUTE PAIN
TYPE: ACUTE PAIN

## 2023-12-30 ASSESSMENT — PAIN DESCRIPTION - LOCATION
LOCATION: TEETH
LOCATION: TEETH

## 2023-12-30 ASSESSMENT — PAIN DESCRIPTION - FREQUENCY
FREQUENCY: CONTINUOUS
FREQUENCY: CONTINUOUS

## 2023-12-30 ASSESSMENT — PAIN DESCRIPTION - ONSET
ONSET: ON-GOING
ONSET: SUDDEN

## 2024-01-02 NOTE — ED PROVIDER NOTES
Independent HARJIT Visit.          [facility] EMERGENCY DEPARTMENT  EMERGENCY DEPARTMENT ENCOUNTER        Pt Name: Sadia Fuentes  MRN: [id number]  Birthdate 1990  Date of evaluation: 2023  Provider: NIA Davis - CNP  PCP: Rhonda Otero MD  Note Started: 12:42 AM EST 24    CHIEF COMPLAINT       Chief Complaint   Patient presents with    Dental Pain     Decayed, cracked tooth lower right side.  Pain going up right side of face and head.  Pain started today.        HISTORY OF PRESENT ILLNESS: 1 or more Elements     History from : Patient    Limitations to history : None    Sadia Fuentes is a 33 y.o. female who presents to the emergency department for dental pain.  Patient states that she has had dental pain for quite some time.  Patient states that she has cracked and decayed teeth to the right lower molars.  She states that the pain worsened today.  She denies any difficulty breathing chewing or swallowing.  Patient denies any facial or neck swelling.  Patient denies any fevers chills she states that she has only tried some ibuprofen without relief.   states that she does not have a dentist.  Patient states that nothing makes her symptoms better or worse.    Nursing Notes were all reviewed and agreed with or any disagreements were addressed in the HPI.    REVIEW OF SYSTEMS :      Review of Systems   All other systems reviewed and are negative.      Positives and Pertinent negatives as per HPI.     SURGICAL HISTORY     Past Surgical History:   Procedure Laterality Date     SECTION  2013    KNEE ARTHROSCOPY Right 2018    acl reconstruction  medial meniscus repair       CURRENTMEDICATIONS       Discharge Medication List as of 2023  8:14 PM          ALLERGIES     Patient has no known allergies.    FAMILYHISTORY     History reviewed. No pertinent family history.     SOCIAL HISTORY       Social History     Tobacco Use    Smoking status: Former

## 2024-07-28 ENCOUNTER — HOSPITAL ENCOUNTER (EMERGENCY)
Age: 34
Discharge: HOME | End: 2024-07-28
Payer: COMMERCIAL

## 2024-07-28 VITALS
TEMPERATURE: 97.9 F | DIASTOLIC BLOOD PRESSURE: 80 MMHG | BODY MASS INDEX: 24.37 KG/M2 | WEIGHT: 129 LBS | SYSTOLIC BLOOD PRESSURE: 119 MMHG | HEART RATE: 69 BPM | OXYGEN SATURATION: 98 % | RESPIRATION RATE: 14 BRPM

## 2024-07-28 DIAGNOSIS — K08.89 PAIN, DENTAL: Primary | ICD-10-CM

## 2024-07-28 PROCEDURE — 99283 EMERGENCY DEPT VISIT LOW MDM: CPT

## 2024-07-28 RX ORDER — LIDOCAINE HYDROCHLORIDE 20 MG/ML
5 SOLUTION OROPHARYNGEAL 3 TIMES DAILY PRN
Qty: 75 ML | Refills: 0 | Status: SHIPPED | OUTPATIENT
Start: 2024-07-28

## 2024-07-28 ASSESSMENT — PAIN DESCRIPTION - ONSET: ONSET: ON-GOING

## 2024-07-28 ASSESSMENT — PAIN DESCRIPTION - DESCRIPTORS: DESCRIPTORS: THROBBING;DISCOMFORT;SHARP

## 2024-07-28 ASSESSMENT — PAIN - FUNCTIONAL ASSESSMENT: PAIN_FUNCTIONAL_ASSESSMENT: 0-10

## 2024-07-28 ASSESSMENT — PAIN DESCRIPTION - LOCATION: LOCATION: TEETH

## 2024-07-28 ASSESSMENT — PAIN SCALES - GENERAL: PAINLEVEL_OUTOF10: 6

## 2024-07-28 ASSESSMENT — PAIN DESCRIPTION - PAIN TYPE: TYPE: ACUTE PAIN

## 2024-07-28 ASSESSMENT — PAIN DESCRIPTION - ORIENTATION: ORIENTATION: LOWER;LEFT

## 2024-07-28 ASSESSMENT — PAIN DESCRIPTION - FREQUENCY: FREQUENCY: CONTINUOUS

## 2024-07-28 NOTE — ED PROVIDER NOTES
Independent HARJIT Visit.     [facility]  Department of Emergency Medicine   ED  Encounter Note  Admit Date/RoomTime: 2024  4:16 PM  ED Room: KENNEDY GAMBLE/NADYA  NAME: Sadia Fuentes  : 1990  MRN: [id number]     Chief Complaint:  Dental Pain (Left upper dental pain started a couple weeks ago.  Family doctor put her on an antibiotic and is on day 4.  Has a decayed tooth.  Pain going up into left side of her head. )    HISTORY OF PRESENT ILLNESS        Sadia Fuentes is a 34 y.o. female who presents to the ED with a complaint of dental pain.  Patient's been having left lower dental pain for several weeks.  Pain does radiate to her left ear.  She did see her PCP.  She has been on clindamycin now for 4 days and Motrin 600 mg.  Pain comes and goes throughout the day, but is definitely worse at night.  More throbbing.  She denies any fevers or chills.  Denies sore throat or difficulty swallowing.  Symptoms are moderate in severity and intermittent.      ROS   Pertinent positives and negatives are stated within HPI, all other systems reviewed and are negative.    Past Medical History:  has a past medical history of ADD (attention deficit disorder with hyperactivity), Anxiety, Bowel obstruction (HCC), Crohn's disease (HCC), and Right ACL tear.    Surgical History:  has a past surgical history that includes  section () and Knee arthroscopy (Right, 2018).    Social History:  reports that she has quit smoking. Her smoking use included cigarettes. She started smoking about 6 years ago. She has a 3.4 pack-year smoking history. She has never used smokeless tobacco. She reports current alcohol use. She reports current drug use. Drug: Marijuana (Weed).    Family History: family history is not on file.     Allergies: Patient has no known allergies.    PHYSICAL EXAM   Oxygen Saturation Interpretation: Normal on room air analysis.        ED Triage Vitals   BP Temp Temp Source Pulse

## 2025-02-25 ENCOUNTER — INITIAL PRENATAL (OUTPATIENT)
Dept: OBGYN | Age: 35
End: 2025-02-25

## 2025-02-25 ENCOUNTER — CLINICAL DOCUMENTATION (OUTPATIENT)
Dept: OBGYN | Age: 35
End: 2025-02-25

## 2025-02-25 VITALS
HEART RATE: 63 BPM | BODY MASS INDEX: 29.06 KG/M2 | OXYGEN SATURATION: 98 % | DIASTOLIC BLOOD PRESSURE: 53 MMHG | WEIGHT: 153.8 LBS | SYSTOLIC BLOOD PRESSURE: 90 MMHG | TEMPERATURE: 97.9 F

## 2025-02-25 DIAGNOSIS — Z98.891 HISTORY OF C-SECTION: ICD-10-CM

## 2025-02-25 DIAGNOSIS — Z34.92 PRENATAL CARE IN SECOND TRIMESTER: ICD-10-CM

## 2025-02-25 DIAGNOSIS — Z34.92 PRENATAL CARE IN SECOND TRIMESTER: Primary | ICD-10-CM

## 2025-02-25 LAB
AMPHETAMINE SCREEN URINE: NEGATIVE
BARBITURATE SCREEN URINE: NEGATIVE
BENZODIAZEPINE SCREEN, URINE: NEGATIVE
BUPRENORPHINE URINE: NEGATIVE
CANNABINOID SCREEN URINE: NEGATIVE
COCAINE METABOLITE, URINE: NEGATIVE
FENTANYL URINE: NEGATIVE
METHADONE SCREEN, URINE: NEGATIVE
OPIATES, URINE: NEGATIVE
OXYCODONE SCREEN URINE: NEGATIVE
PCP,URINE: NEGATIVE
TEST INFORMATION: NORMAL

## 2025-02-25 PROCEDURE — G8427 DOCREV CUR MEDS BY ELIG CLIN: HCPCS | Performed by: STUDENT IN AN ORGANIZED HEALTH CARE EDUCATION/TRAINING PROGRAM

## 2025-02-25 PROCEDURE — 1036F TOBACCO NON-USER: CPT | Performed by: STUDENT IN AN ORGANIZED HEALTH CARE EDUCATION/TRAINING PROGRAM

## 2025-02-25 PROCEDURE — 0501F PRENATAL FLOW SHEET: CPT | Performed by: STUDENT IN AN ORGANIZED HEALTH CARE EDUCATION/TRAINING PROGRAM

## 2025-02-25 PROCEDURE — G8419 CALC BMI OUT NRM PARAM NOF/U: HCPCS | Performed by: STUDENT IN AN ORGANIZED HEALTH CARE EDUCATION/TRAINING PROGRAM

## 2025-02-25 SDOH — ECONOMIC STABILITY: FOOD INSECURITY: WITHIN THE PAST 12 MONTHS, THE FOOD YOU BOUGHT JUST DIDN'T LAST AND YOU DIDN'T HAVE MONEY TO GET MORE.: NEVER TRUE

## 2025-02-25 SDOH — ECONOMIC STABILITY: FOOD INSECURITY: WITHIN THE PAST 12 MONTHS, YOU WORRIED THAT YOUR FOOD WOULD RUN OUT BEFORE YOU GOT MONEY TO BUY MORE.: NEVER TRUE

## 2025-02-25 NOTE — PROGRESS NOTES
Patient alert and pleasant with no complaints.  Here today for initial prenatal visit. Patient is accompanied by , Cheikh.  Fetal heart tones obtained without difficulty.  Urine for glucose and protein obtained with negative results.  Assisted with pelvic exam, pap smear obtained, labeled and sent to lab. Clinical breast exam completed.  Discharge instructions have been discussed with the patient. Patient advised to call our office with any questions or concerns.   Voiced understanding.    
Patient was having blood work drawn at our in house lab.  alerted staff on primary care that there was a patient who had passed out in the lab room while in the middle of her draw. Staff from PCP informed us of patient episode. Went to lab, patient was alert and oriented, appeared pale and clammy. She was provided a cold wash cloth, some water and a snack. Per  patient passed out and began shaking. Patient said this never happened before. She also said she ate breakfast this morning. I asked patient if she had anxiety. She said she has a fear of needles and that she started seeing spots and passed out. Vitals obtained, 90/53, P-63, SPO2- 98%. Patient is resting in lab,  is present. 3 gold tubes were able to be obtained. Patient informed that we will finish drawing her labs at her next scheduled visit.  
   Right ACL tear 2017    Right knee buckling 2017    SROM (spontaneous rupture of membranes) 10/24/2012    Suspected oligohydramnios not found 10/11/2012    High-risk pregnancy 10/01/2012    Fetal tachycardia 2012    Encounter for fetal anatomic survey 2012    Isoimmunization from blood group incompatibility during pregnancy, antepartum 2012    Insufficient prenatal care 2012        Diagnosis Orders   1. Prenatal care in second trimester  CBC    Rubella antibody, IgG    Type and Screen    Culture, Urine    Urine Drug Screen    Varicella Zoster Antibody, IgG    Hemoglobinopathy Evaluation    Hepatitis B Surface Antigen    Hepatitis C Antibody    HIV Screen    RPR    PANORAMA PRENATAL TEST    US OB LESS THAN 14 WEEKS SINGLE OR FIRST GESTATION    PAP SMEAR    Horizon 14 (Pan-Ethnic Standard)      2. History of   CBC    Rubella antibody, IgG    Type and Screen    Culture, Urine    Urine Drug Screen    Varicella Zoster Antibody, IgG    Hemoglobinopathy Evaluation    Hepatitis B Surface Antigen    Hepatitis C Antibody    HIV Screen    RPR    PANORAMA PRENATAL TEST    US OB LESS THAN 14 WEEKS SINGLE OR FIRST GESTATION    PAP SMEAR    Horizon 14 (Pan-Ethnic Standard)          PLAN:     Toxins, food, vaccines reviewed. .   Genetics reviewed, patient wanted ordered.  Pap with GC/Chlamydia cultures today.  Prenatal labs and US ordered.  Past medical, social and family history reviewed and updated in pt's chart.   RTC 4 wk  Approximately 30 minutes spent for patient   Electronically signed by Aziza Givens MD on 25     This report has been created using voice recognition software. It may contain errors which are inherent in voice recognition technology.

## 2025-02-25 NOTE — PROGRESS NOTES
Introduced patient to Centering Pregnancy program. Patient interested in enrolling. Will enroll patient with 3/27 start date.

## 2025-02-26 LAB
HEP B S AGB SURF AG: NONREACTIVE
HEPATITIS C ANTIBODY: NONREACTIVE
HIV AG/AB: NONREACTIVE
RPR: NONREACTIVE

## 2025-02-27 LAB
CULTURE: NO GROWTH
RUBV IGG SER QL: NORMAL IU/ML
SPECIMEN DESCRIPTION: NORMAL
VZV IGG SER QL IA: NORMAL

## 2025-02-28 LAB
CHLAMYDIA BY THIN PREP: NEGATIVE
N. GONORRHOEAE DNA, THIN PREP: NEGATIVE

## 2025-03-01 LAB
HPV SAMPLE: NORMAL
HPV SOURCE: NORMAL
HPV, GENOTYPE 16: NOT DETECTED
HPV, GENOTYPE 18: NOT DETECTED
HPV, HIGH RISK OTHER: NOT DETECTED
HPV, INTERPRETATION: NORMAL

## 2025-03-03 LAB — GYNECOLOGY CYTOLOGY REPORT: NORMAL

## 2025-03-06 ENCOUNTER — HOSPITAL ENCOUNTER (OUTPATIENT)
Dept: ULTRASOUND IMAGING | Age: 35
Discharge: HOME | End: 2025-03-08
Payer: COMMERCIAL

## 2025-03-06 DIAGNOSIS — Z98.891 HISTORY OF C-SECTION: ICD-10-CM

## 2025-03-06 DIAGNOSIS — Z34.92 PRENATAL CARE IN SECOND TRIMESTER: ICD-10-CM

## 2025-03-06 PROCEDURE — 76801 OB US < 14 WKS SINGLE FETUS: CPT

## 2025-03-26 ENCOUNTER — TELEPHONE (OUTPATIENT)
Dept: OBGYN | Age: 35
End: 2025-03-26

## 2025-03-27 ENCOUNTER — ROUTINE PRENATAL (OUTPATIENT)
Dept: OBGYN | Age: 35
End: 2025-03-27
Payer: COMMERCIAL

## 2025-03-27 VITALS
HEART RATE: 73 BPM | DIASTOLIC BLOOD PRESSURE: 74 MMHG | BODY MASS INDEX: 30 KG/M2 | SYSTOLIC BLOOD PRESSURE: 131 MMHG | WEIGHT: 158.8 LBS

## 2025-03-27 DIAGNOSIS — Z34.92 PRENATAL CARE IN SECOND TRIMESTER: Primary | ICD-10-CM

## 2025-03-27 LAB
GLUCOSE URINE, POC: NEGATIVE MG/DL
PROTEIN UA: NEGATIVE

## 2025-03-27 PROCEDURE — G8428 CUR MEDS NOT DOCUMENT: HCPCS | Performed by: MIDWIFE

## 2025-03-27 PROCEDURE — G8419 CALC BMI OUT NRM PARAM NOF/U: HCPCS | Performed by: MIDWIFE

## 2025-03-27 PROCEDURE — 1036F TOBACCO NON-USER: CPT | Performed by: MIDWIFE

## 2025-03-27 PROCEDURE — 81002 URINALYSIS NONAUTO W/O SCOPE: CPT | Performed by: MIDWIFE

## 2025-03-27 PROCEDURE — 0502F SUBSEQUENT PRENATAL CARE: CPT | Performed by: MIDWIFE

## 2025-03-27 NOTE — PROGRESS NOTES
Patient attended Centering Pregnancy session. Educated patient on common discomforts during pregnancy. Patient signed confidentiality form.

## 2025-03-27 NOTE — PROGRESS NOTES
12-18 weeksCentering In Pregnancy: Session 1: Common Discomforts and Making Healthy Lifestyles Choices    Sadia TYE Fuentes is a  female 14w2d    PAST OB HISTORY  OB History    Para Term  AB Living   3 1 1 0 1 0   SAB IAB Ectopic Molar Multiple Live Births   0 0 0 0 0 0      # Outcome Date GA Lbr Vinay/2nd Weight Sex Type Anes PTL Lv   3 Current            2 Term 10/24/12 38w0d  2.778 kg (6 lb 2 oz) F CS-LTranv         Name: DANIELLA,GIRL      Apgar1: 9  Apgar5: 9   1 AB                MEDICAL HISTORY  Past Medical History:   Diagnosis Date    ADD (attention deficit disorder with hyperactivity)     Anxiety     Bowel obstruction (HCC)     Crohn's disease (HCC)     Postpartum depression     Right ACL tear         There was Not yet fetal movements. No bleeding, cramping or leakage of fluid.   She does notreport nausea, vomiting    The patient was seen and evaluated.   Alert, cooperative, oriented to time and Place  Abdomen soft non tender  Skin warm and dry, no peripheral edema noted.     Mother's Prenatal Vitals  BP: 131/74  Weight - Scale: 72 kg (158 lb 12.8 oz)  Pulse: 73  Patient Position: Sitting  Alb/Glu  Albumin: Negative  Glucose: Negative    The following labs or Prenatal Panel results reviewed and discussed with patient.     Pre- Labs:  CBC:  Lab Results   Component Value Date    WBC 6.3 2023    RBC 4.49 2023    HGB 13.2 2023    HCT 40.5 2023    MCV 90.2 2023    MCH 29.4 2023    MCHC 32.6 2023    RDW 12.9 2023     2023    MPV 9.7 2023     Urinalysis:   Lab Results   Component Value Date    COLORU Yellow 2021    CLARITYU Clear 2021    GLUCOSEU Negative 2021    BILIRUBINUR Negative 2021    KETUA >=80 (A) 2021    BLOODU SMALL (A) 2021    PHUR 6.0 2021    PROTEINU Negative 2021    UROBILINOGEN 0.2 2021    NITRU Negative 2021    LEUKOCYTESUR Negative 2021    WBCUA

## 2025-04-09 ENCOUNTER — TELEPHONE (OUTPATIENT)
Dept: OBGYN | Age: 35
End: 2025-04-09

## 2025-04-10 ENCOUNTER — ROUTINE PRENATAL (OUTPATIENT)
Dept: OBGYN | Age: 35
End: 2025-04-10

## 2025-04-10 VITALS
HEART RATE: 81 BPM | BODY MASS INDEX: 30.97 KG/M2 | SYSTOLIC BLOOD PRESSURE: 132 MMHG | DIASTOLIC BLOOD PRESSURE: 73 MMHG | WEIGHT: 163.9 LBS

## 2025-04-10 DIAGNOSIS — Z34.82 PRENATAL CARE, SUBSEQUENT PREGNANCY IN SECOND TRIMESTER: Primary | ICD-10-CM

## 2025-04-10 NOTE — PROGRESS NOTES
12-18 weeksCentering In Pregnancy: Session 2: Nutrition, Healthy Teeth and Gums    Sadia TYE Fuentes is a  female 16w2d    PAST OB HISTORY  OB History    Para Term  AB Living   3 1 1 0 1 0   SAB IAB Ectopic Molar Multiple Live Births   0 0 0 0 0 0      # Outcome Date GA Lbr Vinay/2nd Weight Sex Type Anes PTL Lv   3 Current            2 Term 10/24/12 38w0d  2.778 kg (6 lb 2 oz) F CS-LTranv         Name: DANIELLA,GIRL      Apgar1: 9  Apgar5: 9   1 AB                MEDICAL HISTORY  Past Medical History:   Diagnosis Date    ADD (attention deficit disorder with hyperactivity)     Anxiety     Bowel obstruction (HCC)     Crohn's disease (HCC)     Postpartum depression     Right ACL tear         There was Positive fetal movements. No bleeding, cramping or leakage of fluid.   She does notreport nausea, vomiting    The patient was seen and evaluated.   Alert, cooperative, oriented to time and Place  Abdomen soft non tender  Skin warm and dry, no peripheral edema noted.     Mother's Prenatal Vitals  BP: 132/73  Weight - Scale: 74.3 kg (163 lb 14.4 oz)  Pulse: 81    The following labs or Prenatal Panel results reviewed and discussed with patient.     Pre- Labs:  CBC:  Lab Results   Component Value Date    WBC 6.3 2023    RBC 4.49 2023    HGB 13.2 2023    HCT 40.5 2023    MCV 90.2 2023    MCH 29.4 2023    MCHC 32.6 2023    RDW 12.9 2023     2023    MPV 9.7 2023     Urinalysis:   Lab Results   Component Value Date    COLORU Yellow 2021    CLARITYU Clear 2021    GLUCOSEU Negative 2025    BILIRUBINUR Negative 2021    KETUA >=80 (A) 2021    BLOODU SMALL (A) 2021    PHUR 6.0 2021    PROTEINU Negative 2025    UROBILINOGEN 0.2 2021    NITRU Negative 2021    LEUKOCYTESUR Negative 2021    WBCUA 0-1 2021    RBCUA 2-5 2021    BACTERIA NONE SEEN 2021     TSH:  Lab Results

## 2025-04-14 ENCOUNTER — HOSPITAL ENCOUNTER (OUTPATIENT)
Dept: ULTRASOUND IMAGING | Age: 35
Discharge: HOME | End: 2025-04-16
Payer: COMMERCIAL

## 2025-04-14 DIAGNOSIS — Z34.92 PRENATAL CARE IN SECOND TRIMESTER: ICD-10-CM

## 2025-04-14 PROCEDURE — 76805 OB US >/= 14 WKS SNGL FETUS: CPT

## 2025-04-15 ENCOUNTER — TELEPHONE (OUTPATIENT)
Dept: OBGYN | Age: 35
End: 2025-04-15

## 2025-04-15 NOTE — TELEPHONE ENCOUNTER
Patient reached out to me and requested to know when her next appointment is and had questions about her Maternal Fetal Medicine. I mentioned that her OBGYN will contact her if there is anything wrong on her report. Patient understood and mentioned that I would reach out to her to confirm upcoming appointments.

## 2025-04-23 ENCOUNTER — TELEPHONE (OUTPATIENT)
Dept: OBGYN | Age: 35
End: 2025-04-23

## 2025-04-24 ENCOUNTER — ROUTINE PRENATAL (OUTPATIENT)
Dept: OBGYN | Age: 35
End: 2025-04-24

## 2025-04-24 VITALS
DIASTOLIC BLOOD PRESSURE: 64 MMHG | HEART RATE: 90 BPM | SYSTOLIC BLOOD PRESSURE: 107 MMHG | BODY MASS INDEX: 31.54 KG/M2 | WEIGHT: 166.9 LBS

## 2025-04-24 DIAGNOSIS — Z34.82 PRENATAL CARE, SUBSEQUENT PREGNANCY IN SECOND TRIMESTER: Primary | ICD-10-CM

## 2025-04-24 PROCEDURE — G8419 CALC BMI OUT NRM PARAM NOF/U: HCPCS | Performed by: MIDWIFE

## 2025-04-24 PROCEDURE — 0502F SUBSEQUENT PRENATAL CARE: CPT | Performed by: MIDWIFE

## 2025-04-24 PROCEDURE — G8428 CUR MEDS NOT DOCUMENT: HCPCS | Performed by: MIDWIFE

## 2025-04-24 PROCEDURE — 1036F TOBACCO NON-USER: CPT | Performed by: MIDWIFE

## 2025-04-24 NOTE — PROGRESS NOTES
Patient attended Centering Pregnancy program. Educated patient on breastfeeding and managing stress. Penny COBOS answered questions patients had during session.  
   BACTERIA NONE SEEN 2021     TSH:  Lab Results   Component Value Date    TSH 1.080 2023     RPR:  No components found for: \"LABRPR\"  RUBELLA:  No components found for: \"RUBELLAIGGANTIBODY\"  HIV:  Lab Results   Component Value Date    HIV1X2 Non-Reactive 2022     HEP B:  Lab Results   Component Value Date    HEPBSAG NONREACTIVE 2025     GC PROBE RNA:   Lab Results   Component Value Date/Time    GONORRHEAPTP NEGATIVE 2025 08:00 AM      CHLAMYDIA PROBE RNA:  Lab Results   Component Value Date    CTTP NEGATIVE 2025       BLOOD TYPING:  Lab Results   Component Value Date    LABANTI Positive (A) 10/24/2012    LABANTI ANTI-I 10/24/2012    LABANTI INCONCL 10/24/2012    ABORH O POS 10/24/2012             Assessment:  IUP at 18w2d weeks  2. Previous c/s x 1  3.  Pt desires       Plan:  Pt will return to Centering in Pregnancy in 2 weeks  Continue Medications as ordered.  MFM consult ordered May 14, 2025  An 18-22 week fetal anatomy ultrasound has been ordered.   Total face to face time 15 minutes, greater than 50% spent on counseling the patient or coordinating her care, or discussing complications and problems related to her pregnancy. I answered all of her questions to her satisfaction.     NIA Schafer CNM

## 2025-05-07 ENCOUNTER — TELEPHONE (OUTPATIENT)
Dept: OBGYN | Age: 35
End: 2025-05-07

## 2025-05-08 ENCOUNTER — ROUTINE PRENATAL (OUTPATIENT)
Dept: OBGYN | Age: 35
End: 2025-05-08

## 2025-05-08 VITALS
WEIGHT: 171.8 LBS | BODY MASS INDEX: 32.46 KG/M2 | DIASTOLIC BLOOD PRESSURE: 76 MMHG | HEART RATE: 90 BPM | SYSTOLIC BLOOD PRESSURE: 124 MMHG

## 2025-05-08 DIAGNOSIS — Z34.92 PRENATAL CARE, SECOND TRIMESTER: Primary | ICD-10-CM
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
## 2025-05-14 ENCOUNTER — INITIAL PRENATAL (OUTPATIENT)
Age: 35
End: 2025-05-14

## 2025-05-14 VITALS
BODY MASS INDEX: 32.67 KG/M2 | WEIGHT: 172.9 LBS | DIASTOLIC BLOOD PRESSURE: 75 MMHG | SYSTOLIC BLOOD PRESSURE: 117 MMHG | HEART RATE: 74 BPM

## 2025-05-14 DIAGNOSIS — Z3A.21 21 WEEKS GESTATION OF PREGNANCY: Primary | ICD-10-CM

## 2025-05-14 DIAGNOSIS — Z03.75 SUSPECTED SHORTENING OF CERVIX NOT FOUND: ICD-10-CM

## 2025-05-14 DIAGNOSIS — Z36.3 ENCOUNTER FOR ANTENATAL SCREENING FOR MALFORMATION USING ULTRASOUND: ICD-10-CM

## 2025-05-14 DIAGNOSIS — Z36.3 ENCOUNTER FOR ROUTINE SCREENING FOR MALFORMATION USING ULTRASONICS: ICD-10-CM

## 2025-05-14 LAB
GLUCOSE URINE, POC: NEGATIVE MG/DL
PROTEIN UA: NEGATIVE

## 2025-05-21 ENCOUNTER — TELEPHONE (OUTPATIENT)
Dept: OBGYN | Age: 35
End: 2025-05-21

## 2025-05-22 ENCOUNTER — ROUTINE PRENATAL (OUTPATIENT)
Dept: OBGYN | Age: 35
End: 2025-05-22

## 2025-05-22 VITALS
DIASTOLIC BLOOD PRESSURE: 64 MMHG | SYSTOLIC BLOOD PRESSURE: 115 MMHG | BODY MASS INDEX: 33.67 KG/M2 | WEIGHT: 178.2 LBS | HEART RATE: 83 BPM

## 2025-05-22 DIAGNOSIS — Z34.92 PRENATAL CARE, SECOND TRIMESTER: Primary | ICD-10-CM

## 2025-05-22 LAB
GLUCOSE URINE, POC: NEGATIVE MG/DL
PROTEIN UA: POSITIVE

## 2025-05-22 NOTE — PROGRESS NOTES
Patient attended Centering Pregnancy session. Penny COBOS answered questions patient had during session. Letha DAVIS and Karime KILGORE educated patients on comforts during labor, and early labor- when to call. Dr. Ren was present in session and answered questions for patient.

## 2025-05-22 NOTE — PROGRESS NOTES
19-26 weeksCentering In Pregnancy: Session 5: Comforts During Labor, Knowing When to Call-Early Labor    Sadia Fuentes is a  female 22w2d    PAST OB HISTORY  OB History    Para Term  AB Living   3 1 1 0 1 0   SAB IAB Ectopic Molar Multiple Live Births   0 0 0 0 0 0      # Outcome Date GA Lbr Vinay/2nd Weight Sex Type Anes PTL Lv   3 Current            2 Term 10/24/12 38w0d  2.778 kg (6 lb 2 oz) F CS-LTranv         Name: DANIELLA,GIRL      Apgar1: 9  Apgar5: 9   1 AB                PAST MEDICAL HISTORY  Past Medical History:   Diagnosis Date    ADD (attention deficit disorder with hyperactivity)     Anxiety     Bowel obstruction (HCC)     Crohn's disease (HCC)     Postpartum depression     Right ACL tear           There was positive fetal movements. No contractions or leakage of fluid.   She currently denies any bleeding, cramping, or contractions. No complaints of abdominal pain.     The patient was seen and evaluated.     Physical Exam:  Alert, cooperative, oriented to time and place  Abdomen soft non-tender  Skin warm and dry, no peripheral edema noted.   See flow sheet  Mother's Prenatal Vitals  BP: 115/64  Weight - Scale: 80.8 kg (178 lb 3.2 oz)  Pulse: 83  Patient Position: Sitting  Alb/Glu  Albumin: Trace  Glucose: Negative    Genetic counseling and testing done and reviewed    The patients fetal anatomy ultrasound was completed .   The results of the ultrasound reviewed.      Assessment:  IUP 22w2d weeks  Size  = to dates  Previous c/s x 1  Pt desires         Plan:  The patient will return to [facility] in Pregnancy for her next visit in 2 weeks.  Repeat growth scan per [facility] in third trimester   Continue Medications as ordered.    Total face to face time 15 minutes, greater than 50% spent on counseling the patient or coordinating her care, or discussing complications and problems related to her pregnancy.  I answered all of her questions to her satisfaction.      NIA Schafer

## 2025-06-04 ENCOUNTER — TELEPHONE (OUTPATIENT)
Dept: OBGYN | Age: 35
End: 2025-06-04

## 2025-06-05 ENCOUNTER — ROUTINE PRENATAL (OUTPATIENT)
Dept: OBGYN | Age: 35
End: 2025-06-05
Payer: COMMERCIAL

## 2025-06-05 VITALS
BODY MASS INDEX: 34.37 KG/M2 | WEIGHT: 181.9 LBS | DIASTOLIC BLOOD PRESSURE: 67 MMHG | HEART RATE: 80 BPM | SYSTOLIC BLOOD PRESSURE: 129 MMHG

## 2025-06-05 DIAGNOSIS — O99.891 BACK PAIN AFFECTING PREGNANCY: ICD-10-CM

## 2025-06-05 DIAGNOSIS — Z34.82 PRENATAL CARE, SUBSEQUENT PREGNANCY IN SECOND TRIMESTER: Primary | ICD-10-CM

## 2025-06-05 DIAGNOSIS — O34.219 PREVIOUS CESAREAN DELIVERY AFFECTING PREGNANCY: ICD-10-CM

## 2025-06-05 DIAGNOSIS — O12.02 EDEMA DURING PREGNANCY IN SECOND TRIMESTER: ICD-10-CM

## 2025-06-05 DIAGNOSIS — M54.9 BACK PAIN AFFECTING PREGNANCY: ICD-10-CM

## 2025-06-05 PROCEDURE — 81002 URINALYSIS NONAUTO W/O SCOPE: CPT

## 2025-06-05 PROCEDURE — 0502F SUBSEQUENT PRENATAL CARE: CPT

## 2025-06-05 NOTE — PROGRESS NOTES
, return OB at 24w2d weeks    Patient Active Problem List   Diagnosis    Isoimmunization from blood group incompatibility during pregnancy, antepartum    Previous  delivery affecting pregnancy        Subjective:  doing well, does report back pains, difficulty sleeping, discussed relief measures, pillows, pregnancy belts/girdles  Saw stars yesterday at the pool- encouraged hydration and frequent meals  Also dicussed edema and compression socks, frequent movement and elevation.    Bleeding no   Leaking of fluid no   Painful cramping/contractions no   Headache no   Epigastric pain no   Edema no     Fetal movement good, patient reports 10 movements in 2 hours, discussed anterior placenta    Objective:  See flowsheet  No edema bilaterally  Vitals:    25 1336   BP: 129/67   Pulse: 80     FH 24      Assessment:   at 24w2d   Blood pressure WNL  Size equals dates   rhogam:  Not eligible    ICD-10-CM    1. Prenatal care, subsequent pregnancy in second trimester  Z34.82 POCT urine qual dipstick glucose     POCT urine qual dipstick protein      2. Previous  delivery affecting pregnancy  O34.219            Plan:    RTO 4 weeks  Continue current medications    Orders Placed This Encounter   Procedures    POCT urine qual dipstick glucose    POCT urine qual dipstick protein      Fetal movement:  Baby should move 10 times every 2 hours.  If movements decrease below 10 in 2 hours, or decrease from what is typical for that pregnancy, patient should eat something, drink something, and lay down to count movements.  If she does not feel 10 movements after doing these things, she is to immediately proceed to L&D for NST.  She should NOT WAIT until the next day.     labor precautions discussed with patient.  Patient should report >4 contractions/tightenings in 1 hour after first emptying bladder, laying down, drinking 2 large glasses of water.  Should also promptly report any

## 2025-06-05 NOTE — PROGRESS NOTES
Patient attended Centering Pregnancy session. Educated patient on comforts during labor, labor decisions and the birth experience.

## 2025-06-06 LAB
GLUCOSE URINE, POC: NEGATIVE MG/DL
PROTEIN UA: POSITIVE

## 2025-06-18 ENCOUNTER — TELEPHONE (OUTPATIENT)
Dept: OBGYN | Age: 35
End: 2025-06-18

## 2025-06-19 ENCOUNTER — ROUTINE PRENATAL (OUTPATIENT)
Dept: OBGYN | Age: 35
End: 2025-06-19
Payer: COMMERCIAL

## 2025-06-19 VITALS
SYSTOLIC BLOOD PRESSURE: 112 MMHG | HEART RATE: 82 BPM | WEIGHT: 187.3 LBS | DIASTOLIC BLOOD PRESSURE: 70 MMHG | BODY MASS INDEX: 35.39 KG/M2

## 2025-06-19 DIAGNOSIS — Z3A.26 26 WEEKS GESTATION OF PREGNANCY: Primary | ICD-10-CM

## 2025-06-19 LAB
GLUCOSE URINE, POC: NEGATIVE MG/DL
PROTEIN UA: NEGATIVE

## 2025-06-19 PROCEDURE — 1036F TOBACCO NON-USER: CPT | Performed by: MIDWIFE

## 2025-06-19 PROCEDURE — G8428 CUR MEDS NOT DOCUMENT: HCPCS | Performed by: MIDWIFE

## 2025-06-19 PROCEDURE — 81002 URINALYSIS NONAUTO W/O SCOPE: CPT | Performed by: MIDWIFE

## 2025-06-19 PROCEDURE — G8419 CALC BMI OUT NRM PARAM NOF/U: HCPCS | Performed by: MIDWIFE

## 2025-06-19 PROCEDURE — 0502F SUBSEQUENT PRENATAL CARE: CPT | Performed by: MIDWIFE

## 2025-06-19 NOTE — PROGRESS NOTES
Patient attended Centering Pregnancy session. Educated patient on new-born when to call, and safe sleep practices.

## 2025-06-19 NOTE — PROGRESS NOTES
19-26 weeksCentering In Pregnancy: Session 7: Car Seat Safety, Planning Pediatric Care,  -When to Call    Sadia Fuentes is a  female 26w2d    PAST OB HISTORY  OB History    Para Term  AB Living   3 1 1 0 1 0   SAB IAB Ectopic Molar Multiple Live Births   0 0 0 0 0 0      # Outcome Date GA Lbr Vinay/2nd Weight Sex Type Anes PTL Lv   3 Current            2 Term 10/24/12 38w0d  2.778 kg (6 lb 2 oz) F CS-LTranv         Name: DANIELLA,GIRL      Apgar1: 9  Apgar5: 9   1 AB                PAST MEDICAL HISTORY  Past Medical History:   Diagnosis Date    ADD (attention deficit disorder with hyperactivity)     Anxiety     Bowel obstruction (HCC)     Crohn's disease (HCC)     Postpartum depression     Previous  delivery affecting pregnancy 2025    Right ACL tear           There was positive fetal movements. No contractions or leakage of fluid.   She currently denies any bleeding, cramping, or contractions. No complaints of abdominal pain.     The patient was seen and evaluated.     Physical Exam:  Alert, cooperative, oriented to time and place  Abdomen soft non-tender  Skin warm and dry, no peripheral edema noted.   See flow sheet  Mother's Prenatal Vitals  BP: 112/70  Weight - Scale: 85 kg (187 lb 4.8 oz)  Pulse: 82  Patient Position: Sitting  Alb/Glu  Albumin: Negative  Glucose: Negative         Assessment:  IUP 26w2d weeks  Size  = to dates  Previous c/s x 1  Pt desires    28 weeks labs next visit       Plan:  The patient will return to [facility] in Pregnancy for her next visit in 2 weeks.  A 28 week lab panel will be ordered next visit . This includes a HH, 1 hr GTT, U/A, RPR, Direct Toan. The patient is to complete this between 26 to 28 weeks.  The S/S of Pre-Eclampsia were reviewed with the patient. She is to report headache not relieved by rest, fluids, or tylenol, increase in edema, epigastric pain, or visual disturbances if they occur.    Continue Medications as ordered.

## 2025-07-02 ENCOUNTER — TELEPHONE (OUTPATIENT)
Dept: OBGYN | Age: 35
End: 2025-07-02

## 2025-07-03 ENCOUNTER — ROUTINE PRENATAL (OUTPATIENT)
Dept: OBGYN | Age: 35
End: 2025-07-03
Payer: COMMERCIAL

## 2025-07-03 VITALS
BODY MASS INDEX: 36.32 KG/M2 | SYSTOLIC BLOOD PRESSURE: 122 MMHG | WEIGHT: 192.2 LBS | HEART RATE: 92 BPM | DIASTOLIC BLOOD PRESSURE: 69 MMHG

## 2025-07-03 DIAGNOSIS — Z34.93 PRENATAL CARE, THIRD TRIMESTER: ICD-10-CM

## 2025-07-03 DIAGNOSIS — Z3A.28 28 WEEKS GESTATION OF PREGNANCY: Primary | ICD-10-CM

## 2025-07-03 LAB
BILIRUBIN, URINE: NEGATIVE
COLOR, UA: YELLOW
DAT, POLYSPECIFIC: NEGATIVE
GLUCOSE URINE, POC: NORMAL MG/DL
GLUCOSE URINE: NEGATIVE MG/DL
HCT VFR BLD CALC: 31.3 % (ref 34–48)
HEMOGLOBIN: 10.1 G/DL (ref 11.5–15.5)
KETONES, URINE: NEGATIVE MG/DL
LEUKOCYTE ESTERASE, URINE: ABNORMAL
MCH RBC QN AUTO: 29.4 PG (ref 26–35)
MCHC RBC AUTO-ENTMCNC: 32.3 G/DL (ref 32–34.5)
MCV RBC AUTO: 91 FL (ref 80–99.9)
NITRITE, URINE: NEGATIVE
PDW BLD-RTO: 14.1 % (ref 11.5–15)
PH, URINE: 6 (ref 5–8)
PLATELET # BLD: 264 K/UL (ref 130–450)
PMV BLD AUTO: 9.9 FL (ref 7–12)
PROTEIN UA: NEGATIVE
PROTEIN UA: NEGATIVE MG/DL
RBC # BLD: 3.44 M/UL (ref 3.5–5.5)
RBC UA: NORMAL /HPF
SPECIFIC GRAVITY UA: <1.005 (ref 1–1.03)
TURBIDITY: CLEAR
URINE HGB: ABNORMAL
UROBILINOGEN, URINE: 0.2 EU/DL (ref 0–1)
WBC # BLD: 8.6 K/UL (ref 4.5–11.5)
WBC UA: NORMAL /HPF

## 2025-07-03 PROCEDURE — G8419 CALC BMI OUT NRM PARAM NOF/U: HCPCS | Performed by: MIDWIFE

## 2025-07-03 PROCEDURE — 81002 URINALYSIS NONAUTO W/O SCOPE: CPT | Performed by: MIDWIFE

## 2025-07-03 PROCEDURE — 36415 COLL VENOUS BLD VENIPUNCTURE: CPT | Performed by: MIDWIFE

## 2025-07-03 PROCEDURE — 0502F SUBSEQUENT PRENATAL CARE: CPT | Performed by: MIDWIFE

## 2025-07-03 PROCEDURE — G8427 DOCREV CUR MEDS BY ELIG CLIN: HCPCS | Performed by: MIDWIFE

## 2025-07-03 PROCEDURE — 1036F TOBACCO NON-USER: CPT | Performed by: MIDWIFE

## 2025-07-03 NOTE — PROGRESS NOTES
27-35 weeksCentering In Pregnancy: Session 8: Branden Barajas, Baby Blues, Pregnancy -When to Call     Sadia Fuentes is a 34 y.o. female 28w2d      OB History    Para Term  AB Living   3 1 1 0 1 0   SAB IAB Ectopic Molar Multiple Live Births   0 0 0 0 0 0      # Outcome Date GA Lbr Vinay/2nd Weight Sex Type Anes PTL Lv   3 Current            2 Term 10/24/12 38w0d  2.778 kg (6 lb 2 oz) F CS-LTranv         Name: DANIELLA,GIRL      Apgar1: 9  Apgar5: 9   1 AB                   MEDICAL HISTORY  Past Medical History:   Diagnosis Date    ADD (attention deficit disorder with hyperactivity)     Anxiety     Bowel obstruction (HCC)     Crohn's disease (HCC)     Postpartum depression     Previous  delivery affecting pregnancy 2025    Right ACL tear           There was positive fetal movements. No contractions or leakage of fluid.   She currently denies any bleeding, cramping, or contractions. No complaints of abdominal pain.     The patient was seen and evaluated.   Physical Exam:   Alert, cooperative, oriented to time and place  Abdomen soft non-tender  Skin warm and dry, no peripheral edema noted.   See flow sheet  Mother's Prenatal Vitals  BP: 122/69  Weight - Scale: 87.2 kg (192 lb 3.2 oz)  Pulse: 92  Patient Position: Sitting  Alb/Glu  Albumin: Negative  Glucose: Negative  Prenatal Fetal Information  Movement: Present    The patient had her 28 week labs ordered.   BLOOD TYPING:  Lab Results   Component Value Date    LABANTI Positive (A) 10/24/2012    LABANTI ANTI-I 10/24/2012    LABANTI INCONCL 10/24/2012    ABORH O POS 10/24/2012     RPR:  Lab Results   Component Value Date    RPR NONREACTIVE 2025         Assessment:  IUP at 28w2d  weeks  Size  = to dates  Previous c/s x 1  Pt desires        Plan:  The patient will return to [facility] in Pregnancy for her next visit in 2 weeks.  A 28 week lab panel was ordered. This includes a HH, 1 hr GTT, U/A, RPR, Direct Toan. The patient is to

## 2025-07-03 NOTE — PROGRESS NOTES
Today Session 8 was Facilitated by Mid-wife Penny Mckeondemetriajamie and Myself (Karime Arzate), patient was educated on Emotional Adjustments, Feelings, Baby Blues, Postpartum Depression, Pregnancy-When To Call the Provider also we discussed ABCD Safe Sleep (Alone, on their Back, In a Crib and Don't Smoke).  We discussed Kick Counts.

## 2025-07-03 NOTE — PROGRESS NOTES
1 hr. GTT obtained using aseptic technique.  Pt.  tolerated well.  Pt. drank 50gm glucose solution in under 5 minutes.  Pt. sat for 1 hr. prior to obtaining labs.    All ordered labs completed, labeled and sent to lab. - N

## 2025-07-04 LAB
AMOUNT GLUCOSE GIVEN: NORMAL G
COLLECT TIME, 1HR GLUCOSE: 1518
GLUCOSE TOLERANCE TEST 1 HOUR: 127 MG/DL

## 2025-07-07 LAB — RPR: NONREACTIVE

## 2025-07-16 ENCOUNTER — TELEPHONE (OUTPATIENT)
Dept: OBGYN | Age: 35
End: 2025-07-16

## 2025-07-17 ENCOUNTER — ROUTINE PRENATAL (OUTPATIENT)
Dept: OBGYN | Age: 35
End: 2025-07-17

## 2025-07-17 VITALS
HEART RATE: 88 BPM | DIASTOLIC BLOOD PRESSURE: 66 MMHG | HEIGHT: 62 IN | BODY MASS INDEX: 35.99 KG/M2 | SYSTOLIC BLOOD PRESSURE: 129 MMHG | WEIGHT: 195.6 LBS

## 2025-07-17 DIAGNOSIS — Z3A.30 30 WEEKS GESTATION OF PREGNANCY: Primary | ICD-10-CM

## 2025-07-17 LAB
GLUCOSE URINE, POC: NEGATIVE MG/DL
PROTEIN UA: ABNORMAL

## 2025-07-17 ASSESSMENT — PATIENT HEALTH QUESTIONNAIRE - PHQ9
SUM OF ALL RESPONSES TO PHQ QUESTIONS 1-9: 15
5. POOR APPETITE OR OVEREATING: SEVERAL DAYS
SUM OF ALL RESPONSES TO PHQ QUESTIONS 1-9: 15
4. FEELING TIRED OR HAVING LITTLE ENERGY: NEARLY EVERY DAY
SUM OF ALL RESPONSES TO PHQ QUESTIONS 1-9: 15
9. THOUGHTS THAT YOU WOULD BE BETTER OFF DEAD, OR OF HURTING YOURSELF: NOT AT ALL
6. FEELING BAD ABOUT YOURSELF - OR THAT YOU ARE A FAILURE OR HAVE LET YOURSELF OR YOUR FAMILY DOWN: SEVERAL DAYS
3. TROUBLE FALLING OR STAYING ASLEEP: NEARLY EVERY DAY
7. TROUBLE CONCENTRATING ON THINGS, SUCH AS READING THE NEWSPAPER OR WATCHING TELEVISION: NEARLY EVERY DAY
1. LITTLE INTEREST OR PLEASURE IN DOING THINGS: MORE THAN HALF THE DAYS
SUM OF ALL RESPONSES TO PHQ QUESTIONS 1-9: 15
2. FEELING DOWN, DEPRESSED OR HOPELESS: MORE THAN HALF THE DAYS
8. MOVING OR SPEAKING SO SLOWLY THAT OTHER PEOPLE COULD HAVE NOTICED. OR THE OPPOSITE, BEING SO FIGETY OR RESTLESS THAT YOU HAVE BEEN MOVING AROUND A LOT MORE THAN USUAL: NOT AT ALL
10. IF YOU CHECKED OFF ANY PROBLEMS, HOW DIFFICULT HAVE THESE PROBLEMS MADE IT FOR YOU TO DO YOUR WORK, TAKE CARE OF THINGS AT HOME, OR GET ALONG WITH OTHER PEOPLE: SOMEWHAT DIFFICULT

## 2025-07-17 NOTE — PROGRESS NOTES
Today was session 9 and patient was educated on Infant Safety also gaining the knowledge of Thinking ahead and assigning chores and asking for help. We discussed breathing techniques when in Active Labor, [place] Safety and Safe-Care and Techniques on feeding your  baby. We went over the importance of avoiding pre-term Labor. Patient participated in group activity making bracelets, she received shower washing kit and Safe Sleep Sack.    
Patient alert and pleasant with no complaints.  Here today for prenatal visit.  Fetal heart tones obtained without difficulty.  Urine for glucose (negatiive) and protein (trace) obtained.  PHQ2 and pHQ9 completed.   Discharge instructions have been discussed with the patient. Patient advised to call our office with any questions or concerns.   Voiced understanding.    
weeks.  Tdap at next visit   .The S/S of Pre-Eclampsia were reviewed with the patient. She is to report headache not relieved by rest, fluids, or tylenol, increase in edema, epigastric pain, or visual disturbances if they occur.    Continue Medications as ordered.    Total face to face time 15 minutes, greater than 50% spent on counseling the patient or coordinating her care, or discussing complications and problems related to her pregnancy.  I answered all of her questions to her satisfaction.      NIA Schafer - ABDIAS

## 2025-07-30 ENCOUNTER — TELEPHONE (OUTPATIENT)
Dept: OBGYN | Age: 35
End: 2025-07-30

## 2025-07-31 ENCOUNTER — ROUTINE PRENATAL (OUTPATIENT)
Dept: OBGYN | Age: 35
End: 2025-07-31
Payer: COMMERCIAL

## 2025-07-31 ENCOUNTER — ROUTINE PRENATAL (OUTPATIENT)
Dept: OBGYN | Age: 35
End: 2025-07-31

## 2025-07-31 VITALS
HEART RATE: 91 BPM | BODY MASS INDEX: 36.89 KG/M2 | DIASTOLIC BLOOD PRESSURE: 60 MMHG | WEIGHT: 201.7 LBS | SYSTOLIC BLOOD PRESSURE: 107 MMHG

## 2025-07-31 DIAGNOSIS — Z34.83 PRENATAL CARE, SUBSEQUENT PREGNANCY IN THIRD TRIMESTER: Primary | ICD-10-CM

## 2025-07-31 DIAGNOSIS — Z34.93 PRENATAL CARE, THIRD TRIMESTER: Primary | ICD-10-CM

## 2025-07-31 DIAGNOSIS — O34.219 PREVIOUS CESAREAN DELIVERY AFFECTING PREGNANCY: ICD-10-CM

## 2025-07-31 LAB
GLUCOSE URINE, POC: NEGATIVE MG/DL
GLUCOSE URINE, POC: NORMAL MG/DL
PROTEIN UA: POSITIVE
PROTEIN UA: POSITIVE

## 2025-07-31 PROCEDURE — 90471 IMMUNIZATION ADMIN: CPT | Performed by: STUDENT IN AN ORGANIZED HEALTH CARE EDUCATION/TRAINING PROGRAM

## 2025-07-31 PROCEDURE — 90715 TDAP VACCINE 7 YRS/> IM: CPT | Performed by: STUDENT IN AN ORGANIZED HEALTH CARE EDUCATION/TRAINING PROGRAM

## 2025-07-31 PROCEDURE — G8427 DOCREV CUR MEDS BY ELIG CLIN: HCPCS | Performed by: STUDENT IN AN ORGANIZED HEALTH CARE EDUCATION/TRAINING PROGRAM

## 2025-07-31 PROCEDURE — 1036F TOBACCO NON-USER: CPT | Performed by: STUDENT IN AN ORGANIZED HEALTH CARE EDUCATION/TRAINING PROGRAM

## 2025-07-31 PROCEDURE — 81002 URINALYSIS NONAUTO W/O SCOPE: CPT | Performed by: STUDENT IN AN ORGANIZED HEALTH CARE EDUCATION/TRAINING PROGRAM

## 2025-07-31 PROCEDURE — G8419 CALC BMI OUT NRM PARAM NOF/U: HCPCS | Performed by: STUDENT IN AN ORGANIZED HEALTH CARE EDUCATION/TRAINING PROGRAM

## 2025-07-31 PROCEDURE — 0502F SUBSEQUENT PRENATAL CARE: CPT | Performed by: STUDENT IN AN ORGANIZED HEALTH CARE EDUCATION/TRAINING PROGRAM

## 2025-07-31 NOTE — PROGRESS NOTES
SUBJECTIVE:   35 y.o.  female here for routine OB appointment. +ve fm, no pain/vb/lof    OB History    Para Term  AB Living   3 1 1  1    SAB IAB Ectopic Molar Multiple Live Births              # Outcome Date GA Lbr Vinay/2nd Weight Sex Type Anes PTL Lv   3 Current            2 Term 10/24/12 38w0d  2.778 kg (6 lb 2 oz) F CS-LTranv      1 AB                Past Medical History:   Diagnosis Date    ADD (attention deficit disorder with hyperactivity)     Anxiety     Bowel obstruction (HCC)     Crohn's disease (HCC)     Postpartum depression     Previous  delivery affecting pregnancy 2025    Right ACL tear         Past Surgical History:   Procedure Laterality Date     SECTION  2013    KNEE ARTHROSCOPY Right 2018    acl reconstruction  medial meniscus repair        Family History   Problem Relation Age of Onset    Diabetes type 2  Father     Crohn's Disease Mother         Social History       Tobacco History       Smoking Status  Former Smoking Start Date  2017 Quit Date  2024 Average Packs/Day  0.5 packs/day for 7.1 years (3.6 ttl pk-yrs) Smoking Tobacco Type  Cigarettes from 2017 to 2024   Pack Year History     Packs/Day From To Years    0 2024  0.6    0.5 2024 7.1      Smokeless Tobacco Use  Never      Tobacco Comments  I havent smoked a cigarette in over 5 years but  I did vape occasionally  I have since quit all              Alcohol History       Alcohol Use Status  Not Asked Comment  socially              Drug Use       Drug Use Status  Not Currently Types  Marijuana (Weed) Comment  occassional              Sexual Activity       Sexually Active  Yes Partners  Male                      Current Outpatient Medications:     Prenatal MV-Min-Fe Fum-FA-DHA (PRENATAL 1 PO), Take by mouth, Disp: , Rfl:     aspirin-acetaminophen-caffeine (EXCEDRIN MIGRAINE) 250-250-65 MG per tablet, Take by mouth (Patient not taking: Reported on

## 2025-07-31 NOTE — PROGRESS NOTES
Routine Prenatal Visit (Numbness and swelling in BLE and right hand/ wrist.)  35 y.o. female,  here for routine prenatal visit at 32w2d. Date of last Cervical Cancer screen (HPV or PAP): 2/25/2025. No breast cancer screening on file. Denies bleeding and fluid leakage. Patient does complain of some Mild Cramping.    Immunizations Administered       Name Date Dose Route    TDaP, ADACEL (age 10y-64y), BOOSTRIX (age 10y+), IM, 0.5mL 7/31/2025 0.5 mL Intramuscular    Site: Deltoid- Left    Lot: [id number]    NDC: 56135-499-97             Patient voiced fear of injections and lab draws. 1:1 with to avoid anxiety during administration was successful. Patient tolerated well. Walked patient to centering room to make sure she had no feelings of weakness or dizziness.

## 2025-08-14 ENCOUNTER — ROUTINE PRENATAL (OUTPATIENT)
Dept: OBGYN | Age: 35
End: 2025-08-14

## 2025-08-14 VITALS
BODY MASS INDEX: 37.68 KG/M2 | SYSTOLIC BLOOD PRESSURE: 116 MMHG | WEIGHT: 206 LBS | DIASTOLIC BLOOD PRESSURE: 69 MMHG | HEART RATE: 91 BPM

## 2025-08-14 DIAGNOSIS — Z98.891 HISTORY OF C-SECTION: ICD-10-CM

## 2025-08-14 DIAGNOSIS — Z34.83 PRENATAL CARE, SUBSEQUENT PREGNANCY IN THIRD TRIMESTER: Primary | ICD-10-CM

## 2025-08-19 ENCOUNTER — TELEPHONE (OUTPATIENT)
Dept: OBGYN | Age: 35
End: 2025-08-19

## 2025-08-22 ENCOUNTER — ROUTINE PRENATAL (OUTPATIENT)
Age: 35
End: 2025-08-22

## 2025-08-22 VITALS
HEART RATE: 90 BPM | TEMPERATURE: 97.2 F | SYSTOLIC BLOOD PRESSURE: 126 MMHG | DIASTOLIC BLOOD PRESSURE: 80 MMHG | OXYGEN SATURATION: 98 % | BODY MASS INDEX: 37.31 KG/M2 | WEIGHT: 204 LBS | RESPIRATION RATE: 12 BRPM

## 2025-08-22 DIAGNOSIS — Z36.3 ENCOUNTER FOR ROUTINE SCREENING FOR MALFORMATION USING ULTRASONICS: ICD-10-CM

## 2025-08-22 DIAGNOSIS — Z36.9 ENCOUNTER FOR FETAL ULTRASOUND: ICD-10-CM

## 2025-08-22 DIAGNOSIS — Z36.89 ENCOUNTER FOR ULTRASOUND TO CHECK FETAL GROWTH: Primary | ICD-10-CM

## 2025-08-22 LAB
GLUCOSE URINE, POC: NORMAL MG/DL
PROTEIN UA: POSITIVE

## 2025-08-23 PROBLEM — Z36.89 ENCOUNTER FOR ULTRASOUND TO CHECK FETAL GROWTH: Status: ACTIVE | Noted: 2025-08-23

## 2025-08-27 ENCOUNTER — ROUTINE PRENATAL (OUTPATIENT)
Dept: OBGYN | Age: 35
End: 2025-08-27

## 2025-08-27 VITALS
HEART RATE: 90 BPM | SYSTOLIC BLOOD PRESSURE: 122 MMHG | BODY MASS INDEX: 37.86 KG/M2 | DIASTOLIC BLOOD PRESSURE: 64 MMHG | WEIGHT: 207 LBS

## 2025-08-27 DIAGNOSIS — Z34.83 PRENATAL CARE, SUBSEQUENT PREGNANCY IN THIRD TRIMESTER: Primary | ICD-10-CM

## 2025-08-27 DIAGNOSIS — Z98.891 HISTORY OF C-SECTION: ICD-10-CM

## 2025-08-27 LAB
GLUCOSE URINE, POC: NORMAL MG/DL
PROTEIN UA: NEGATIVE

## 2025-08-27 PROCEDURE — 0502F SUBSEQUENT PRENATAL CARE: CPT | Performed by: STUDENT IN AN ORGANIZED HEALTH CARE EDUCATION/TRAINING PROGRAM

## 2025-08-27 PROCEDURE — G8419 CALC BMI OUT NRM PARAM NOF/U: HCPCS | Performed by: STUDENT IN AN ORGANIZED HEALTH CARE EDUCATION/TRAINING PROGRAM

## 2025-08-27 PROCEDURE — 1036F TOBACCO NON-USER: CPT | Performed by: STUDENT IN AN ORGANIZED HEALTH CARE EDUCATION/TRAINING PROGRAM

## 2025-08-27 PROCEDURE — G8427 DOCREV CUR MEDS BY ELIG CLIN: HCPCS | Performed by: STUDENT IN AN ORGANIZED HEALTH CARE EDUCATION/TRAINING PROGRAM

## 2025-08-29 LAB
C TRACH DNA GENITAL QL NAA+PROBE: NEGATIVE
N. GONORRHOEAE DNA: NEGATIVE

## 2025-08-31 LAB
CULTURE: NORMAL
SPECIMEN DESCRIPTION: NORMAL

## 2025-09-03 ENCOUNTER — ROUTINE PRENATAL (OUTPATIENT)
Dept: OBGYN | Age: 35
End: 2025-09-03

## 2025-09-03 VITALS
RESPIRATION RATE: 20 BRPM | SYSTOLIC BLOOD PRESSURE: 130 MMHG | HEART RATE: 86 BPM | WEIGHT: 209 LBS | DIASTOLIC BLOOD PRESSURE: 76 MMHG | TEMPERATURE: 98.8 F | BODY MASS INDEX: 38.23 KG/M2 | OXYGEN SATURATION: 98 %

## 2025-09-03 DIAGNOSIS — Z34.83 ENCOUNTER FOR SUPERVISION OF OTHER NORMAL PREGNANCY IN THIRD TRIMESTER: Primary | ICD-10-CM

## 2025-09-03 DIAGNOSIS — Z3A.37 37 WEEKS GESTATION OF PREGNANCY: ICD-10-CM

## 2025-09-03 LAB
GLUCOSE URINE, POC: NEGATIVE MG/DL
PROTEIN UA: NEGATIVE

## 2025-09-03 PROCEDURE — 0502F SUBSEQUENT PRENATAL CARE: CPT | Performed by: OBSTETRICS & GYNECOLOGY
